# Patient Record
Sex: FEMALE | Race: BLACK OR AFRICAN AMERICAN | NOT HISPANIC OR LATINO | URBAN - METROPOLITAN AREA
[De-identification: names, ages, dates, MRNs, and addresses within clinical notes are randomized per-mention and may not be internally consistent; named-entity substitution may affect disease eponyms.]

---

## 2024-11-01 ENCOUNTER — INPATIENT (INPATIENT)
Facility: HOSPITAL | Age: 69
LOS: 4 days | Discharge: HOME HEALTH | End: 2024-11-06
Attending: INTERNAL MEDICINE | Admitting: INTERNAL MEDICINE
Payer: MEDICARE

## 2024-11-01 VITALS
SYSTOLIC BLOOD PRESSURE: 134 MMHG | TEMPERATURE: 99 F | WEIGHT: 121.25 LBS | OXYGEN SATURATION: 97 % | RESPIRATION RATE: 18 BRPM | HEART RATE: 56 BPM | DIASTOLIC BLOOD PRESSURE: 75 MMHG

## 2024-11-01 DIAGNOSIS — E03.9 HYPOTHYROIDISM, UNSPECIFIED: ICD-10-CM

## 2024-11-01 DIAGNOSIS — H40.9 UNSPECIFIED GLAUCOMA: ICD-10-CM

## 2024-11-01 DIAGNOSIS — Z98.890 OTHER SPECIFIED POSTPROCEDURAL STATES: Chronic | ICD-10-CM

## 2024-11-01 DIAGNOSIS — I10 ESSENTIAL (PRIMARY) HYPERTENSION: ICD-10-CM

## 2024-11-01 DIAGNOSIS — R00.1 BRADYCARDIA, UNSPECIFIED: ICD-10-CM

## 2024-11-01 DIAGNOSIS — E78.5 HYPERLIPIDEMIA, UNSPECIFIED: ICD-10-CM

## 2024-11-01 DIAGNOSIS — R63.4 ABNORMAL WEIGHT LOSS: ICD-10-CM

## 2024-11-01 DIAGNOSIS — Z29.9 ENCOUNTER FOR PROPHYLACTIC MEASURES, UNSPECIFIED: ICD-10-CM

## 2024-11-01 DIAGNOSIS — Z90.710 ACQUIRED ABSENCE OF BOTH CERVIX AND UTERUS: Chronic | ICD-10-CM

## 2024-11-01 LAB
ADD ON TEST-SPECIMEN IN LAB: SIGNIFICANT CHANGE UP
ALBUMIN SERPL ELPH-MCNC: 4.2 G/DL — SIGNIFICANT CHANGE UP (ref 3.3–5)
ALP SERPL-CCNC: 57 U/L — SIGNIFICANT CHANGE UP (ref 40–120)
ALT FLD-CCNC: 26 U/L — SIGNIFICANT CHANGE UP (ref 4–33)
ANION GAP SERPL CALC-SCNC: 12 MMOL/L — SIGNIFICANT CHANGE UP (ref 7–14)
APPEARANCE UR: CLEAR — SIGNIFICANT CHANGE UP
AST SERPL-CCNC: 25 U/L — SIGNIFICANT CHANGE UP (ref 4–32)
B PERT DNA SPEC QL NAA+PROBE: SIGNIFICANT CHANGE UP
B PERT+PARAPERT DNA PNL SPEC NAA+PROBE: SIGNIFICANT CHANGE UP
BASOPHILS # BLD AUTO: 0.03 K/UL — SIGNIFICANT CHANGE UP (ref 0–0.2)
BASOPHILS NFR BLD AUTO: 0.8 % — SIGNIFICANT CHANGE UP (ref 0–2)
BILIRUB SERPL-MCNC: 1 MG/DL — SIGNIFICANT CHANGE UP (ref 0.2–1.2)
BILIRUB UR-MCNC: NEGATIVE — SIGNIFICANT CHANGE UP
BUN SERPL-MCNC: 9 MG/DL — SIGNIFICANT CHANGE UP (ref 7–23)
C PNEUM DNA SPEC QL NAA+PROBE: SIGNIFICANT CHANGE UP
CALCIUM SERPL-MCNC: 9.3 MG/DL — SIGNIFICANT CHANGE UP (ref 8.4–10.5)
CHLORIDE SERPL-SCNC: 104 MMOL/L — SIGNIFICANT CHANGE UP (ref 98–107)
CO2 SERPL-SCNC: 26 MMOL/L — SIGNIFICANT CHANGE UP (ref 22–31)
COLOR SPEC: YELLOW — SIGNIFICANT CHANGE UP
CREAT SERPL-MCNC: 0.73 MG/DL — SIGNIFICANT CHANGE UP (ref 0.5–1.3)
CRP SERPL-MCNC: <3 MG/L — SIGNIFICANT CHANGE UP
DIFF PNL FLD: NEGATIVE — SIGNIFICANT CHANGE UP
EGFR: 88 ML/MIN/1.73M2 — SIGNIFICANT CHANGE UP
EOSINOPHIL # BLD AUTO: 0.16 K/UL — SIGNIFICANT CHANGE UP (ref 0–0.5)
EOSINOPHIL NFR BLD AUTO: 4.5 % — SIGNIFICANT CHANGE UP (ref 0–6)
ERYTHROCYTE [SEDIMENTATION RATE] IN BLOOD: 3 MM/HR — LOW (ref 4–25)
FLUAV AG NPH QL: SIGNIFICANT CHANGE UP
FLUAV SUBTYP SPEC NAA+PROBE: SIGNIFICANT CHANGE UP
FLUBV AG NPH QL: SIGNIFICANT CHANGE UP
FLUBV RNA SPEC QL NAA+PROBE: SIGNIFICANT CHANGE UP
GLUCOSE SERPL-MCNC: 99 MG/DL — SIGNIFICANT CHANGE UP (ref 70–99)
GLUCOSE UR QL: NEGATIVE MG/DL — SIGNIFICANT CHANGE UP
HADV DNA SPEC QL NAA+PROBE: SIGNIFICANT CHANGE UP
HCOV 229E RNA SPEC QL NAA+PROBE: SIGNIFICANT CHANGE UP
HCOV HKU1 RNA SPEC QL NAA+PROBE: SIGNIFICANT CHANGE UP
HCOV NL63 RNA SPEC QL NAA+PROBE: SIGNIFICANT CHANGE UP
HCOV OC43 RNA SPEC QL NAA+PROBE: SIGNIFICANT CHANGE UP
HCT VFR BLD CALC: 38.3 % — SIGNIFICANT CHANGE UP (ref 34.5–45)
HGB BLD-MCNC: 12.7 G/DL — SIGNIFICANT CHANGE UP (ref 11.5–15.5)
HMPV RNA SPEC QL NAA+PROBE: SIGNIFICANT CHANGE UP
HPIV1 RNA SPEC QL NAA+PROBE: SIGNIFICANT CHANGE UP
HPIV2 RNA SPEC QL NAA+PROBE: SIGNIFICANT CHANGE UP
HPIV3 RNA SPEC QL NAA+PROBE: SIGNIFICANT CHANGE UP
HPIV4 RNA SPEC QL NAA+PROBE: SIGNIFICANT CHANGE UP
IANC: 1.82 K/UL — SIGNIFICANT CHANGE UP (ref 1.8–7.4)
IMM GRANULOCYTES NFR BLD AUTO: 0.3 % — SIGNIFICANT CHANGE UP (ref 0–0.9)
KETONES UR-MCNC: NEGATIVE MG/DL — SIGNIFICANT CHANGE UP
LEUKOCYTE ESTERASE UR-ACNC: NEGATIVE — SIGNIFICANT CHANGE UP
LYMPHOCYTES # BLD AUTO: 1.22 K/UL — SIGNIFICANT CHANGE UP (ref 1–3.3)
LYMPHOCYTES # BLD AUTO: 34 % — SIGNIFICANT CHANGE UP (ref 13–44)
M PNEUMO DNA SPEC QL NAA+PROBE: SIGNIFICANT CHANGE UP
MCHC RBC-ENTMCNC: 30.8 PG — SIGNIFICANT CHANGE UP (ref 27–34)
MCHC RBC-ENTMCNC: 33.2 G/DL — SIGNIFICANT CHANGE UP (ref 32–36)
MCV RBC AUTO: 93 FL — SIGNIFICANT CHANGE UP (ref 80–100)
MONOCYTES # BLD AUTO: 0.35 K/UL — SIGNIFICANT CHANGE UP (ref 0–0.9)
MONOCYTES NFR BLD AUTO: 9.7 % — SIGNIFICANT CHANGE UP (ref 2–14)
NEUTROPHILS # BLD AUTO: 1.82 K/UL — SIGNIFICANT CHANGE UP (ref 1.8–7.4)
NEUTROPHILS NFR BLD AUTO: 50.7 % — SIGNIFICANT CHANGE UP (ref 43–77)
NITRITE UR-MCNC: NEGATIVE — SIGNIFICANT CHANGE UP
NRBC # BLD: 0 /100 WBCS — SIGNIFICANT CHANGE UP (ref 0–0)
NRBC # FLD: 0 K/UL — SIGNIFICANT CHANGE UP (ref 0–0)
PH UR: 7.5 — SIGNIFICANT CHANGE UP (ref 5–8)
PLATELET # BLD AUTO: 209 K/UL — SIGNIFICANT CHANGE UP (ref 150–400)
POTASSIUM SERPL-MCNC: 3.6 MMOL/L — SIGNIFICANT CHANGE UP (ref 3.5–5.3)
POTASSIUM SERPL-SCNC: 3.6 MMOL/L — SIGNIFICANT CHANGE UP (ref 3.5–5.3)
PROCALCITONIN SERPL-MCNC: 0.05 NG/ML — SIGNIFICANT CHANGE UP (ref 0.02–0.1)
PROT SERPL-MCNC: 6.7 G/DL — SIGNIFICANT CHANGE UP (ref 6–8.3)
PROT UR-MCNC: NEGATIVE MG/DL — SIGNIFICANT CHANGE UP
RAPID RVP RESULT: SIGNIFICANT CHANGE UP
RBC # BLD: 4.12 M/UL — SIGNIFICANT CHANGE UP (ref 3.8–5.2)
RBC # FLD: 13.5 % — SIGNIFICANT CHANGE UP (ref 10.3–14.5)
RSV RNA NPH QL NAA+NON-PROBE: SIGNIFICANT CHANGE UP
RSV RNA SPEC QL NAA+PROBE: SIGNIFICANT CHANGE UP
RV+EV RNA SPEC QL NAA+PROBE: SIGNIFICANT CHANGE UP
SARS-COV-2 RNA SPEC QL NAA+PROBE: SIGNIFICANT CHANGE UP
SARS-COV-2 RNA SPEC QL NAA+PROBE: SIGNIFICANT CHANGE UP
SODIUM SERPL-SCNC: 142 MMOL/L — SIGNIFICANT CHANGE UP (ref 135–145)
SP GR SPEC: 1.01 — SIGNIFICANT CHANGE UP (ref 1–1.03)
T4 FREE SERPL-MCNC: 2 NG/DL — HIGH (ref 0.9–1.8)
TROPONIN T, HIGH SENSITIVITY RESULT: 14 NG/L — SIGNIFICANT CHANGE UP
TSH SERPL-MCNC: <0.1 UIU/ML — LOW (ref 0.27–4.2)
UROBILINOGEN FLD QL: 1 MG/DL — SIGNIFICANT CHANGE UP (ref 0.2–1)
WBC # BLD: 3.59 K/UL — LOW (ref 3.8–10.5)
WBC # FLD AUTO: 3.59 K/UL — LOW (ref 3.8–10.5)

## 2024-11-01 PROCEDURE — 71045 X-RAY EXAM CHEST 1 VIEW: CPT | Mod: 26

## 2024-11-01 PROCEDURE — 99231 SBSQ HOSP IP/OBS SF/LOW 25: CPT

## 2024-11-01 PROCEDURE — 99223 1ST HOSP IP/OBS HIGH 75: CPT

## 2024-11-01 PROCEDURE — 99285 EMERGENCY DEPT VISIT HI MDM: CPT

## 2024-11-01 RX ORDER — ASPIRIN/MAG CARB/ALUMINUM AMIN 325 MG
81 TABLET ORAL DAILY
Refills: 0 | Status: DISCONTINUED | OUTPATIENT
Start: 2024-11-01 | End: 2024-11-06

## 2024-11-01 RX ORDER — MAGNESIUM SULFATE IN 0.9% NACL 2 G/50 ML
2 INTRAVENOUS SOLUTION, PIGGYBACK (ML) INTRAVENOUS ONCE
Refills: 0 | Status: COMPLETED | OUTPATIENT
Start: 2024-11-01 | End: 2024-11-01

## 2024-11-01 RX ORDER — DORZOLAMIDE HYDROCHLORIDE 20 MG/ML
1 SOLUTION OPHTHALMIC
Refills: 0 | Status: DISCONTINUED | OUTPATIENT
Start: 2024-11-01 | End: 2024-11-06

## 2024-11-01 RX ORDER — ACETAZOLAMIDE EXTENDED-RELEASE 500 MG/1
1 CAPSULE ORAL
Refills: 0 | DISCHARGE

## 2024-11-01 RX ORDER — ENOXAPARIN SODIUM 40MG/0.4ML
40 SYRINGE (ML) SUBCUTANEOUS
Refills: 0 | Status: DISCONTINUED | OUTPATIENT
Start: 2024-11-01 | End: 2024-11-06

## 2024-11-01 RX ORDER — ACETAMINOPHEN 500 MG
650 TABLET ORAL EVERY 6 HOURS
Refills: 0 | Status: DISCONTINUED | OUTPATIENT
Start: 2024-11-01 | End: 2024-11-06

## 2024-11-01 RX ORDER — POTASSIUM CHLORIDE 10 MEQ
40 TABLET, EXTENDED RELEASE ORAL ONCE
Refills: 0 | Status: COMPLETED | OUTPATIENT
Start: 2024-11-01 | End: 2024-11-01

## 2024-11-01 RX ORDER — BRIMONIDINE TARTRATE 0.2 %
1 DROPS OPHTHALMIC (EYE)
Refills: 0 | DISCHARGE

## 2024-11-01 RX ORDER — MELATONIN 5 MG
3 TABLET ORAL AT BEDTIME
Refills: 0 | Status: DISCONTINUED | OUTPATIENT
Start: 2024-11-01 | End: 2024-11-06

## 2024-11-01 RX ORDER — HYDROCHLOROTHIAZIDE 50 MG
1 TABLET ORAL
Refills: 0 | DISCHARGE

## 2024-11-01 RX ORDER — BRIMONIDINE TARTRATE 0.2 %
1 DROPS OPHTHALMIC (EYE)
Refills: 0 | Status: DISCONTINUED | OUTPATIENT
Start: 2024-11-01 | End: 2024-11-06

## 2024-11-01 RX ORDER — LEVOTHYROXINE SODIUM 88 MCG
75 TABLET ORAL DAILY
Refills: 0 | Status: DISCONTINUED | OUTPATIENT
Start: 2024-11-02 | End: 2024-11-06

## 2024-11-01 RX ORDER — TIMOLOL MALEATE 0.5 %
1 DROPS OPHTHALMIC (EYE)
Refills: 0 | Status: DISCONTINUED | OUTPATIENT
Start: 2024-11-01 | End: 2024-11-06

## 2024-11-01 RX ORDER — ACETAZOLAMIDE EXTENDED-RELEASE 500 MG/1
125 CAPSULE ORAL THREE TIMES A DAY
Refills: 0 | Status: DISCONTINUED | OUTPATIENT
Start: 2024-11-01 | End: 2024-11-06

## 2024-11-01 RX ORDER — INFLUENZ VIR VAC TV P-SURF2003 15MCG/.5ML
0.5 SYRINGE (ML) INTRAMUSCULAR ONCE
Refills: 0 | Status: DISCONTINUED | OUTPATIENT
Start: 2024-11-01 | End: 2024-11-06

## 2024-11-01 RX ORDER — LATANOPROST 0.005 %
1 DROPS OPHTHALMIC (EYE) AT BEDTIME
Refills: 0 | Status: DISCONTINUED | OUTPATIENT
Start: 2024-11-01 | End: 2024-11-06

## 2024-11-01 RX ADMIN — ACETAZOLAMIDE EXTENDED-RELEASE 125 MILLIGRAM(S): 500 CAPSULE ORAL at 21:12

## 2024-11-01 RX ADMIN — Medication 81 MILLIGRAM(S): at 21:25

## 2024-11-01 RX ADMIN — Medication 40 MILLIEQUIVALENT(S): at 15:55

## 2024-11-01 RX ADMIN — Medication 1 DROP(S): at 21:12

## 2024-11-01 RX ADMIN — Medication 25 GRAM(S): at 15:55

## 2024-11-01 RX ADMIN — Medication 1 DROP(S): at 21:11

## 2024-11-01 RX ADMIN — DORZOLAMIDE HYDROCHLORIDE 1 DROP(S): 20 SOLUTION OPHTHALMIC at 21:12

## 2024-11-01 RX ADMIN — Medication 10 MILLIGRAM(S): at 21:11

## 2024-11-01 NOTE — ED ADULT TRIAGE NOTE - CHIEF COMPLAINT QUOTE
Recent travel from [facility], states "not feeling well for long time", per daughter pt has been having palpitations and unintentional weight loss and wants further medical evaluation, h/o HTN

## 2024-11-01 NOTE — CHART NOTE - NSCHARTNOTEFT_GEN_A_CORE
69F hx of hypothyroidism presents to ED for asymptomatic bradycardia on routine exam. Endocrine consulted for thyroid abnormalities: low TSH <0.10 and elevated FT4 at 2.    Labs consistent with hyperthyroidism - likely due to higher than needed levothyroxine dose. Hyperthyroidism would NOT be the cause or contribute to bradycardia (if anything you can see tachycardia - although FT4 of 2.0 is very mild). Hyperthyroidism can contribute to weight loss however, given this is mild hyperthyroidis, would not expect it to contribute to this extent as listed in patients history - please evaluate for other etiology.    Recommendations  - decrease dose of levothyroxine to 75mcg daily  - ensure it is taken daily on an empty stomach without any other medications/vitamins  - patient to recheck TSH and Free T4 in 4-6 weeks with outpatient provider    Patient does not need full endocrinology consult at this time. Endocrinology will sign off at this time. Please re-consult us if there any questions or concerns.      Discussed recommendations with primary team.    Magnus Franco MD  Endocrine Fellow  Can be reached via [employer] teams.    For follow up questions, discharge recommendations, or new consults, please email [email or web address] ([facility]) or [email or web address] ([facility]) or call answering service at [phone] (weekdays); [phone] (nights/weekends).  For emergencies please page fellow on call. 69F hx of hypothyroidism presents to ED for asymptomatic bradycardia on routine exam. Endocrine consulted for thyroid abnormalities: low TSH <0.10 and elevated FT4 at 2.    Labs consistent with hyperthyroidism - likely due to higher than needed levothyroxine dose. Hyperthyroidism would NOT be the cause or contribute to bradycardia (if anything you can see tachycardia - although FT4 of 2.0 is very mild). Hyperthyroidism can contribute to weight loss however, given this is mild hyperthyroidism, would not expect it to contribute to this extent as listed in patients history - please evaluate for other etiology.    Recommendations  - decrease dose of levothyroxine to 75mcg daily  - ensure it is taken daily on an empty stomach without any other medications/vitamins  - patient to recheck TSH and Free T4 in 4-6 weeks with outpatient provider    Patient does not need full endocrinology consult at this time. Endocrinology will sign off at this time. Please re-consult us if there any questions or concerns.      Discussed recommendations with primary team.    Magnus Franco MD  Endocrine Fellow  Can be reached via [employer] teams.    For follow up questions, discharge recommendations, or new consults, please email [email or web address] ([facility]) or [email or web address] ([facility]) or call answering service at [phone] (weekdays); [phone] (nights/weekends).  For emergencies please page fellow on call.

## 2024-11-01 NOTE — CONSULT NOTE ADULT - ATTENDING COMMENTS
Quality 431: Preventive Care And Screening: Unhealthy Alcohol Use - Screening: Patient not identified as an unhealthy alcohol user when screened for unhealthy alcohol use using a systematic screening method Quality 137: Melanoma: Continuity Of Care - Recall System: Patient information entered into a recall system that includes: target date for the next exam specified AND a process to follow up with patients regarding missed or unscheduled appointments Quality 226: Preventive Care And Screening: Tobacco Use: Screening And Cessation Intervention: Patient screened for tobacco use and is an ex/non-smoker Detail Level: Detailed Quality 397: Melanoma: Reporting: Pathology report includes the pT Category, thickness, ulceration and mitotic rate, peripheral and deep margin status and presence or absence of microsatellitosis for invasive tumors. 69 year old woman with HTN, hypothyroidism and glaucoma brought to hospital by her daughter for evaluation of bradycardia. In my interview with her, the patient was adamant that she feels no chest pain, palpitations, SOB, dizziness, fatigue, GUNN, near syncope. Her ECG shows sinus bradycardia. No events have been detected on telemetry. Check echo, monitor HR with ambulation. Can consider exercise stress test for chronotropic competence. Quality 130: Documentation Of Current Medications In The Medical Record: Current Medications Documented Quality 138: Melanoma: Coordination Of Care: A treatment plan was communicated to the physicians providing continuing care within one month of diagnosis outlining: diagnosis, tumor thickness and a plan for surgery or alternate care.

## 2024-11-01 NOTE — H&P ADULT - NSHPPHYSICALEXAM_GEN_ALL_CORE
Vitals:  Vital Signs Last 24 Hrs  T(C): 36.4 (01 Nov 2024 16:41), Max: 37.1 (01 Nov 2024 10:33)  T(F): 97.6 (01 Nov 2024 16:41), Max: 98.7 (01 Nov 2024 10:33)  HR: 51 (01 Nov 2024 16:41) (45 - 56)  BP: 175/80 (01 Nov 2024 16:41) (134/75 - 175/80)  BP(mean): 107 (01 Nov 2024 14:24) (107 - 107)  RR: 19 (01 Nov 2024 16:41) (18 - 19)  SpO2: 100% (01 Nov 2024 16:41) (97% - 100%)    Parameters below as of 01 Nov 2024 16:41  Patient On (Oxygen Delivery Method): room air      Oxygenation Index= Unable to calculate   [Based on FiO2 = Unknown, PaO2 = Unknown, MAP = Unknown]  Oxygen Saturation Index= Unable to calculate   [Based on FiO2 = Unknown, SpO2 = 100(11/01/2024 16:41), MAP = Unknown]  Orthostatic VS      I&O's Summary    CAPILLARY BLOOD GLUCOSE          PHYSICAL EXAM:  GENERAL: NAD, lying in bed comfortably, talking in full sentences, no accessory mm use, on RA, appears younger than stated age  HEENT: NC/AT, EOMI, PERRLA, MMM, no LAD  NECK: Supple, No JVD  CHEST/LUNG: CTAB, no increased WOB, no w/r/r  HEART: bradycardic, no m/r/g  ABDOMEN: soft, NT, ND, BS+  EXTREMITIES:  2+ peripheral pulses, no clubbing, no edema  NERVOUS SYSTEM:  A&Ox4, no focal deficits  MSK: FROM all 4 extremities, full and equal strength  SKIN: No overt rashes or lesions

## 2024-11-01 NOTE — H&P ADULT - PROBLEM SELECTOR PLAN 5
- c/w home brimonidine, dorzolamide-timolol, and latanoprost eye drops  - c/w home acetazolamide 125mg TID

## 2024-11-01 NOTE — ED ADULT NURSE REASSESSMENT NOTE - NS ED NURSE REASSESS COMMENT FT1
Facilitator RN: PT is resting in stretcher, easily arousable to verbal stimuli. no acute distress noted. pt medicated as per provider orders. primary nurse aware. plan of care ongoing.

## 2024-11-01 NOTE — ED PROVIDER NOTE - PROGRESS NOTE DETAILS
Francesco Hogue MD, PGY3  Lab work nonactionable.  Noted to have a TSH of less than 0.1.  Patient takes Synthroid daily.  Low TSH not consistent with patient's presentation.  Patient remains bradycardic, sinus bradycardia in 40s while in emergency department.  Normal mentation, normotensive.  Will admit for symptomatic bradycardia.  Discussed with telemetry doc, Dr. Sandra, who accepted admission

## 2024-11-01 NOTE — PATIENT PROFILE ADULT - FLU SEASON?

## 2024-11-01 NOTE — ED PROVIDER NOTE - ATTENDING CONTRIBUTION TO CARE
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  Daughter at bedside states that pt lives in [facility] but she was called by pt's daughter and told that mother was decompensating and she should [place] the [place] for [place].  Pt herself has no acute complaints, including no CP/SOB, N/V/D or abdominal pain.  On exam pt overall well appearing, in NAD, heart bradycardic and regular, lungs CTAB, abd NTND, extremities without swelling, strength 5/5 in all extremities and skin without rash.  EKG showing sinus bradycardia.    I, Santi Alcazar MD, personally made/approved the management plan for this patient and take responsibility for the patient's management. Dr. Alcazar: 70 yo female with PMH HTN, HLD, glaucoma, hypothyroid, in ED with fatigue "for a couple of weeks" and weight loss over longer period of time.  Daughter at bedside states that pt lives in [facility] but she was called by pt's daughter and told that mother was decompensating and she should [place] to the [place] for care.  Pt herself has no acute complaints, including no CP/SOB, N/V/D or abdominal pain.  On exam pt overall well appearing, in NAD, heart bradycardic and regular, lungs CTAB, abd NTND, extremities without swelling, strength 5/5 in all extremities and skin without rash.  EKG showing sinus bradycardia    I, Santi Alcazar MD, personally made/approved the management plan for this patient and take responsibility for the patient's management.

## 2024-11-01 NOTE — H&P ADULT - HISTORY OF PRESENT ILLNESS
Ms. Fuentes is a 68 y/o F with PMH of HTN, HLD, Hypothyroidism, prediabetes, Glaucoma (sp laser iritoectomy in past) who presented from her home in [facility] [place] after notable to have bradycardia to 40s on her routine OP physical exam on 10/25/24. Pt went to her PCP in [facility] for routine annual physical where she was noted to have bradycardia to 48 on EKG. Pt and family arranged for the soonest flight they could to [place] to [place] further medical care. Pt seen in [facility] with daughter Cady and reports no active cp, sob, fevers, chills, n/v/d/c, abd pain, palpitations, dizziness/lightheadedness. Also reports feeling overall without cardiac sx as well during her previous PCP visit too. Has been seeing a neurologist recently for unclear issue and was prescribed 2 medications, unsure of name (daughter to get the bottles). Has been otherwise complaints with meds and no other med changes. No toxic habits x3. Does report having increased weight loss from 160s-170s to 120s recently since April, no appetite issues reports, hematuria, hemoptysis, hematochezia/melena. no active rg or night sweats, reports some weakness/tiredness. Colonoscopy done 4 years in [facility] and self reports to be without issues. No other cardiac hx or hx of arrythmia in past, not symptomatic on ambulation.     In the ED, vitals notable for Bp in 170s/70s, HR in 50s, tele reviewed with HR ranging from 40s-50s. Labs reviewed and rfp wnl, cbc notable for WBC 3.59 rest of labs wnl, Trop wnl x1, TSH low at <0.10, crp wnl. covid/flu/rsv negative. EKG reviewed and notable for sinus bradycardia, HR in 50s. UA negative.     Pt admitted for further workup for asx bradycardia.

## 2024-11-01 NOTE — PATIENT PROFILE ADULT - FALL HARM RISK - UNIVERSAL INTERVENTIONS
Bed in lowest position, wheels locked, appropriate side rails in place/Call bell, personal items and telephone in reach/Instruct patient to call for assistance before getting out of bed or chair/Non-slip footwear when patient is out of bed/[place] to call system/Physically safe environment - no spills, clutter or unnecessary equipment/Purposeful Proactive Rounding/Room/bathroom lighting operational, light cord in reach

## 2024-11-01 NOTE — H&P ADULT - NSICDXPASTMEDICALHX_GEN_ALL_CORE_FT
PAST MEDICAL HISTORY:  Glaucoma     HLD (hyperlipidemia)     HTN (hypertension)     Hypothyroidism

## 2024-11-01 NOTE — ED PROVIDER NOTE - DIFFERENTIAL DIAGNOSIS
electrolyte abnormality, infection, dehydration, intrinsic cardiac issue, ?malignancy (weight loss) Differential Diagnosis

## 2024-11-01 NOTE — H&P ADULT - PROBLEM SELECTOR PLAN 4
- chronic  - on levothyroxine 88mcg at home  - TSH <0.10 but with bradycardia + unintended weight loss  - check FT4/FT3  - endocrine consulted via email given discordance of presenting sx (weight loss, weakness/fatigue with low TSH); recs appreciated

## 2024-11-01 NOTE — ED PROVIDER NOTE - CLINICAL SUMMARY MEDICAL DECISION MAKING FREE TEXT BOX
Francesco Hogue MD, PGY3  69-year-old female with past medical history of hypertension, hypothyroidism, glaucoma presenting to emergency department accompanied by daughter for generalized fatigue, weakness.  Patient resides in [facility], saw primary care physician there was told she had a low heart rate.  Daughter decided to fly patient to [place] to seek further medical care.  Patient arrived in [place] yesterday.  No recent changes to her medications.  No history of cardiac arrhythmia that patient is aware of.  Ambulates without walker at baseline.  Was able to self ambulate earlier today but feels generalized fatigue and weakness when ambulating.  No recent falls or head trauma.  Also endorses weight loss over past few months.  Unsure exactly how much weight patient has lost but daughter states appears much thinner than usual.  Denies fever, headache, chest pain, shortness of breath, abdominal pain, nausea, vomiting, diarrhea, extremity edema, rash.    Gen: No acute distress  HEENT: EOMI, no nasal discharge, mucous membranes moist  CV: RRR, +S1/S2, no M/R/G, 2+ radial pulses b/l  Resp: CTAB, no W/R/R, no accessory muscle use, no increased work of breathing  GI: Abdomen soft non-distended, NTTP  MSK: No open wounds, no bruising, no LE edema  Neuro: A&Ox4, following commands, moving all four extremities spontaneously  Psych: appropriate mood    In the emergency department patient noted to have blood pressure of 45 in triage.  Otherwise hemodynamically stable and afebrile.  Patient in no acute distress.  EKG showing sinus bradycardia.  Exam unremarkable.  Differential including but not limited to arrhythmia, electrolyte abnormalities, thyroid pathology, anemia.  Plan to obtain labs, chest x-ray, urine, . Will reassess, disposition pending workup.

## 2024-11-01 NOTE — H&P ADULT - ASSESSMENT
Ms. Fuentes is a 68 y/o F with PMH of HTN, HLD, Hypothyroidism, prediabetes, Glaucoma (sp laser iritoectomy in past) who presented from her home in [facility] [place] after notable to have bradycardia to 40s on her routine OP physical exam on 10/25/24. Pt went to her PCP in [facility] for routine annual physical where she was noted to have bradycardia to 48 on EKG. EP consulted in the ED. Admitted for further workup for asx bradycardia.

## 2024-11-01 NOTE — H&P ADULT - PROBLEM SELECTOR PLAN 1
- new and asx   - EKG notable for sinus bradycardia with HR in 50s with HR in 40s-50s on tele as well. Trop wnl x1, check another trop.   - EP consulted; recs appreciated  - check TTE  - plan for exercise stress test tomorrow to assess for chronotropic response  - meds reviewed and daughter to bring in other new medications started recently.   - TSH <0.10, check FT4/FT3  - monitor on tele  - keep lytes K >4, Mg >2  - melatonin prn for sleep

## 2024-11-01 NOTE — H&P ADULT - PROBLEM SELECTOR PLAN 2
- ongoing weight loss, about 30-40 lbs lost over the past few months, reports some weakness/fatigue  - check CT chest and AP with IV contrast for further evaluation for ?malignancy  - check iron panel, b12, folate, vit D, FLP, Hba1c for further evaluation / screening

## 2024-11-01 NOTE — ED PROVIDER NOTE - NSICDXPASTMEDICALHX_GEN_ALL_CORE_FT
PAST MEDICAL HISTORY:  Glaucoma     HLD (hyperlipidemia)     HTN (hypertension)     Hypothyroid

## 2024-11-01 NOTE — ED ADULT NURSE NOTE - NSFALLUNIVINTERV_ED_ALL_ED
Bed/Stretcher in lowest position, wheels locked, appropriate side rails in place/Call bell, personal items and telephone in reach/Instruct patient to call for assistance before getting out of bed/chair/stretcher/Non-slip footwear applied when patient is off stretcher/[place] to call system/Physically safe environment - no spills, clutter or unnecessary equipment/Purposeful proactive rounding/Room/bathroom lighting operational, light cord in reach

## 2024-11-01 NOTE — ED ADULT NURSE NOTE - OBJECTIVE STATEMENT
pt brought to area#4 pt A&ox4 pt was sent from MD for eval of slow HR pt Hr noted to be 45 placed on cardiac monitor pt denies any complaints currently v/s charted MD eval in progress

## 2024-11-01 NOTE — H&P ADULT - NSHPLABSRESULTS_GEN_ALL_CORE
LABS:                         12.7   3.59  )-----------( 209      ( 2024 11:40 )             38.3         142  |  104  |  9   ----------------------------<  99  3.6   |  26  |  0.73    Ca    9.3      2024 11:40  Phos  3.7       Mg     2.30         TPro  6.7  /  Alb  4.2  /  TBili  1.0  /  DBili  x   /  AST  25  /  ALT  26  /  AlkPhos  57        Urinalysis Basic - ( 2024 11:40 )    Color: Yellow / Appearance: Clear / S.007 / pH: x  Gluc: 99 mg/dL / Ketone: Negative mg/dL  / Bili: Negative / Urobili: 1.0 mg/dL   Blood: x / Protein: Negative mg/dL / Nitrite: Negative   Leuk Esterase: Negative / RBC: x / WBC x   Sq Epi: x / Non Sq Epi: x / Bacteria: x                  Urinalysis with Rflx Culture (collected 24 @ 11:40)        RADIOLOGY, EKG & ADDITIONAL TESTS: Reviewed.

## 2024-11-01 NOTE — ED ADULT NURSE NOTE - CHIEF COMPLAINT QUOTE
Recent travel from [facility], states "not feeling well for long time", per daughter pt has been having palpitations and unintentional weight loss and wants further medical evaluation, h/o HTN

## 2024-11-01 NOTE — H&P ADULT - PROBLEM SELECTOR PLAN 3
- chronic  - elevated BP in 170s in the ED  - c/w home HCTZ 12.5mg daily   - monitor BP closely  - c/w home aspirin (no hx of stents / MI in past)

## 2024-11-02 ENCOUNTER — RESULT REVIEW (OUTPATIENT)
Age: 69
End: 2024-11-02

## 2024-11-02 LAB
24R-OH-CALCIDIOL SERPL-MCNC: 46.2 NG/ML — SIGNIFICANT CHANGE UP (ref 30–80)
A1C WITH ESTIMATED AVERAGE GLUCOSE RESULT: 6 % — HIGH (ref 4–5.6)
ALBUMIN SERPL ELPH-MCNC: 3.7 G/DL — SIGNIFICANT CHANGE UP (ref 3.3–5)
ALP SERPL-CCNC: 54 U/L — SIGNIFICANT CHANGE UP (ref 40–120)
ALT FLD-CCNC: 21 U/L — SIGNIFICANT CHANGE UP (ref 4–33)
ANION GAP SERPL CALC-SCNC: 12 MMOL/L — SIGNIFICANT CHANGE UP (ref 7–14)
AST SERPL-CCNC: 24 U/L — SIGNIFICANT CHANGE UP (ref 4–32)
BASOPHILS # BLD AUTO: 0.04 K/UL — SIGNIFICANT CHANGE UP (ref 0–0.2)
BASOPHILS NFR BLD AUTO: 1.2 % — SIGNIFICANT CHANGE UP (ref 0–2)
BILIRUB SERPL-MCNC: 0.8 MG/DL — SIGNIFICANT CHANGE UP (ref 0.2–1.2)
BUN SERPL-MCNC: 8 MG/DL — SIGNIFICANT CHANGE UP (ref 7–23)
CALCIUM SERPL-MCNC: 8.7 MG/DL — SIGNIFICANT CHANGE UP (ref 8.4–10.5)
CHLORIDE SERPL-SCNC: 107 MMOL/L — SIGNIFICANT CHANGE UP (ref 98–107)
CHOLEST SERPL-MCNC: 126 MG/DL — SIGNIFICANT CHANGE UP
CO2 SERPL-SCNC: 22 MMOL/L — SIGNIFICANT CHANGE UP (ref 22–31)
CREAT SERPL-MCNC: 0.72 MG/DL — SIGNIFICANT CHANGE UP (ref 0.5–1.3)
EGFR: 89 ML/MIN/1.73M2 — SIGNIFICANT CHANGE UP
EOSINOPHIL # BLD AUTO: 0.16 K/UL — SIGNIFICANT CHANGE UP (ref 0–0.5)
EOSINOPHIL NFR BLD AUTO: 4.9 % — SIGNIFICANT CHANGE UP (ref 0–6)
ESTIMATED AVERAGE GLUCOSE: 126 — SIGNIFICANT CHANGE UP
FERRITIN SERPL-MCNC: 202 NG/ML — SIGNIFICANT CHANGE UP (ref 13–330)
FOLATE SERPL-MCNC: 9.2 NG/ML — SIGNIFICANT CHANGE UP (ref 3.1–17.5)
GLUCOSE SERPL-MCNC: 92 MG/DL — SIGNIFICANT CHANGE UP (ref 70–99)
HAPTOGLOB SERPL-MCNC: 53 MG/DL — SIGNIFICANT CHANGE UP (ref 34–200)
HCT VFR BLD CALC: 39 % — SIGNIFICANT CHANGE UP (ref 34.5–45)
HDLC SERPL-MCNC: 44 MG/DL — LOW
HGB BLD-MCNC: 12.8 G/DL — SIGNIFICANT CHANGE UP (ref 11.5–15.5)
IANC: 1.6 K/UL — LOW (ref 1.8–7.4)
IMM GRANULOCYTES NFR BLD AUTO: 0.3 % — SIGNIFICANT CHANGE UP (ref 0–0.9)
IRON SATN MFR SERPL: 22 % — SIGNIFICANT CHANGE UP (ref 14–50)
IRON SATN MFR SERPL: 48 UG/DL — SIGNIFICANT CHANGE UP (ref 30–160)
LDH SERPL L TO P-CCNC: 237 U/L — HIGH (ref 135–225)
LIPID PNL WITH DIRECT LDL SERPL: 71 MG/DL — SIGNIFICANT CHANGE UP
LYMPHOCYTES # BLD AUTO: 1.13 K/UL — SIGNIFICANT CHANGE UP (ref 1–3.3)
LYMPHOCYTES # BLD AUTO: 34.7 % — SIGNIFICANT CHANGE UP (ref 13–44)
MCHC RBC-ENTMCNC: 30.5 PG — SIGNIFICANT CHANGE UP (ref 27–34)
MCHC RBC-ENTMCNC: 32.8 G/DL — SIGNIFICANT CHANGE UP (ref 32–36)
MCV RBC AUTO: 93.1 FL — SIGNIFICANT CHANGE UP (ref 80–100)
MONOCYTES # BLD AUTO: 0.32 K/UL — SIGNIFICANT CHANGE UP (ref 0–0.9)
MONOCYTES NFR BLD AUTO: 9.8 % — SIGNIFICANT CHANGE UP (ref 2–14)
NEUTROPHILS # BLD AUTO: 1.6 K/UL — LOW (ref 1.8–7.4)
NEUTROPHILS NFR BLD AUTO: 49.1 % — SIGNIFICANT CHANGE UP (ref 43–77)
NON HDL CHOLESTEROL: 82 MG/DL — SIGNIFICANT CHANGE UP
NRBC # BLD: 0 /100 WBCS — SIGNIFICANT CHANGE UP (ref 0–0)
NRBC # FLD: 0 K/UL — SIGNIFICANT CHANGE UP (ref 0–0)
PLATELET # BLD AUTO: 197 K/UL — SIGNIFICANT CHANGE UP (ref 150–400)
POTASSIUM SERPL-MCNC: 3 MMOL/L — LOW (ref 3.5–5.3)
POTASSIUM SERPL-SCNC: 3 MMOL/L — LOW (ref 3.5–5.3)
PROT SERPL-MCNC: 6.3 G/DL — SIGNIFICANT CHANGE UP (ref 6–8.3)
RBC # BLD: 4.19 M/UL — SIGNIFICANT CHANGE UP (ref 3.8–5.2)
RBC # FLD: 13.3 % — SIGNIFICANT CHANGE UP (ref 10.3–14.5)
SODIUM SERPL-SCNC: 141 MMOL/L — SIGNIFICANT CHANGE UP (ref 135–145)
T3FREE SERPL-MCNC: 2.5 PG/ML — SIGNIFICANT CHANGE UP (ref 2–4.4)
THYROGLOB AB FLD IA-ACNC: 3652 IU/ML — HIGH
THYROGLOB AB SERPL-ACNC: 3652 IU/ML — HIGH
THYROGLOB SERPL-MCNC: <0.1 NG/ML — LOW (ref 0.9–77.3)
THYROPEROXIDASE AB SERPL-ACNC: 164 IU/ML — HIGH
TIBC SERPL-MCNC: 219 UG/DL — LOW (ref 220–430)
TRIGL SERPL-MCNC: 53 MG/DL — SIGNIFICANT CHANGE UP
TROPONIN T, HIGH SENSITIVITY RESULT: 14 NG/L — SIGNIFICANT CHANGE UP
UIBC SERPL-MCNC: 171 UG/DL — SIGNIFICANT CHANGE UP (ref 110–370)
VIT B12 SERPL-MCNC: 421 PG/ML — SIGNIFICANT CHANGE UP (ref 200–900)
WBC # BLD: 3.26 K/UL — LOW (ref 3.8–10.5)
WBC # FLD AUTO: 3.26 K/UL — LOW (ref 3.8–10.5)

## 2024-11-02 PROCEDURE — 71260 CT THORAX DX C+: CPT | Mod: 26

## 2024-11-02 PROCEDURE — 74177 CT ABD & PELVIS W/CONTRAST: CPT | Mod: 26

## 2024-11-02 PROCEDURE — 93016 CV STRESS TEST SUPVJ ONLY: CPT | Mod: GC

## 2024-11-02 PROCEDURE — 93018 CV STRESS TEST I&R ONLY: CPT | Mod: GC

## 2024-11-02 RX ORDER — POTASSIUM CHLORIDE 10 MEQ
40 TABLET, EXTENDED RELEASE ORAL EVERY 4 HOURS
Refills: 0 | Status: COMPLETED | OUTPATIENT
Start: 2024-11-02 | End: 2024-11-02

## 2024-11-02 RX ADMIN — Medication 40 MILLIEQUIVALENT(S): at 10:24

## 2024-11-02 RX ADMIN — DORZOLAMIDE HYDROCHLORIDE 1 DROP(S): 20 SOLUTION OPHTHALMIC at 17:18

## 2024-11-02 RX ADMIN — Medication 1 DROP(S): at 05:45

## 2024-11-02 RX ADMIN — Medication 40 MILLIGRAM(S): at 21:33

## 2024-11-02 RX ADMIN — Medication 40 MILLIEQUIVALENT(S): at 14:08

## 2024-11-02 RX ADMIN — DORZOLAMIDE HYDROCHLORIDE 1 DROP(S): 20 SOLUTION OPHTHALMIC at 05:44

## 2024-11-02 RX ADMIN — ACETAZOLAMIDE EXTENDED-RELEASE 125 MILLIGRAM(S): 500 CAPSULE ORAL at 07:44

## 2024-11-02 RX ADMIN — Medication 1 DROP(S): at 21:33

## 2024-11-02 RX ADMIN — Medication 10 MILLIGRAM(S): at 21:33

## 2024-11-02 RX ADMIN — ACETAZOLAMIDE EXTENDED-RELEASE 125 MILLIGRAM(S): 500 CAPSULE ORAL at 14:09

## 2024-11-02 RX ADMIN — ACETAZOLAMIDE EXTENDED-RELEASE 125 MILLIGRAM(S): 500 CAPSULE ORAL at 21:34

## 2024-11-02 RX ADMIN — Medication 75 MICROGRAM(S): at 05:42

## 2024-11-02 RX ADMIN — Medication 1 DROP(S): at 05:44

## 2024-11-02 RX ADMIN — Medication 1 DROP(S): at 17:17

## 2024-11-02 RX ADMIN — Medication 81 MILLIGRAM(S): at 14:09

## 2024-11-02 RX ADMIN — Medication 1 DROP(S): at 17:16

## 2024-11-02 NOTE — DIETITIAN INITIAL EVALUATION ADULT - OTHER INFO
Patient is receiving a PO diet order. Denies difficulty chewing or swallowing on current diet consistency. Reports fair PO intake with fair appetite, usually completes 50% of meals per patient reports. Patient is able to make her meal selection at visit. No nausea, vomit, diarrhea, constipation. + BM reported 11/1 per RN flowsheet. Labs notable for low TSH, levothyroxine 75mcg was initiated per endocrinology due to "Labs consistent with hyperthyroidism - likely due to higher than needed levothyroxine dose". Also noted hypokalemia 3 s/p KCl repletion. Patient hx prediabetes, noted Hgb A1c@ 6% (11/1). Dosing weight is 55kg /121 lbs (11/1), Bedscale confirmed 117.4 lbs /53.3 kg. Dosing weight suggests a 48 lbs/28.4% weight loss in ~6 months. Patient noted moderate- severe physical findings, meeting criteria for malnutrition at this time. Patient is amenable for Ensure Plus High Protein 8 oz. 2 x/day (700kcal, 40gm protein) for nutrition support at this time. Patient verbalized understanding of current diet order and no other nutrition related question/concern voiced.

## 2024-11-02 NOTE — DIETITIAN INITIAL EVALUATION ADULT - ADD RECOMMEND
1. Recommend current diet with provision of Ensure Plus High Protein 8 oz. 2 x/day  2. Nursing to consistently document % PO intake in RN flow sheets and monitor weekly weights.   3. Continue to monitor skin integrity, bowel regimen, and nutrition pertinent labs.

## 2024-11-02 NOTE — DIETITIAN INITIAL EVALUATION ADULT - ORAL INTAKE PTA/DIET HISTORY
Patient is A&Ox 3-4 at baseline. Confirmed no food allergy or intolerance, no pork/lamb per preferences. Patient is following low sodium diet at home. Patient endorses recent weight loss in 6 months from 169 lbs in April and slightly decreased appetite for unclear reason. Patient mentioned that she traveled last year for three months (Nov-), then she started to feel weight loss, fatigue, and weakness over time. Patient stated her foods in pantry room were not good however patient is back and forth talking about her home in  and [facility]. Patient did not elaborate more about possible food insecurity. Estimated PO intake is </=75% EER>/=1 months as a result per discussion.    Per [facility] record, obtained weight history as followin.6kg in 2018.

## 2024-11-02 NOTE — DIETITIAN INITIAL EVALUATION ADULT - PROBLEM SELECTOR PLAN 4
mother
- chronic  - on levothyroxine 88mcg at home  - TSH <0.10 but with bradycardia + unintended weight loss  - check FT4/FT3  - endocrine consulted via email given discordance of presenting sx (weight loss, weakness/fatigue with low TSH); recs appreciated

## 2024-11-02 NOTE — CONSULT NOTE ADULT - SUBJECTIVE AND OBJECTIVE BOX
Cardiology Consult Note   [Please check [email or web address] password: [email or web address]" for cardiology service schedule and contact information]    HPI: 70 yo F hx HTN, Hypothyroid, Glaucoma presenting for concern for symptomatic bradycardia.    Pt lives in [facility]. Reports she has felt normal but saw her PCP on Thursday who raised concern that she was bradycardic and appeared SOB. Daughter asked her to fly to [place] for further evaluation. Pt reports she walked to the airport wo SOB, LH, CP or other symptoms though reportedly told ED providers she has felt fatigued and weak. Daughter also reports patient appears to have lost weight though unclear how much.      PAST MEDICAL & SURGICAL HISTORY:  Glaucoma      Hypothyroid      HTN (hypertension)      HLD (hyperlipidemia)      H/O abdominal hysterectomy        FAMILY HISTORY:  No pertinent family history in first degree relatives      SOCIAL HISTORY:  unchanged    MEDICATIONS:        acetaminophen     Tablet .. 650 milliGRAM(s) Oral every 6 hours PRN  melatonin 3 milliGRAM(s) Oral at bedtime PRN        magnesium sulfate  IVPB 2 Gram(s) IV Intermittent once  potassium chloride   Powder 40 milliEquivalent(s) Oral once        -------------------------------------------------------------------------------------------  PHYSICAL EXAM:  T(C): 36.4 (11-01-24 @ 14:24), Max: 37.1 (11-01-24 @ 10:33)  HR: 50 (11-01-24 @ 14:24) (45 - 56)  BP: 173/79 (11-01-24 @ 14:24) (134/75 - 173/79)  RR: 19 (11-01-24 @ 14:24) (18 - 19)  SpO2: 98% (11-01-24 @ 14:24) (97% - 98%)  Wt(kg): --  I&O's Summary      GENERAL: Somewhat fatigued appearing but NAD  HEENT: EOMI  CHEST/LUNG:  Unlabored respirations.  HEART: Bradycardia; No murmurs, rubs, or gallops.  ABDOMEN: soft, NTND  EXTREMITIES:  no edema    -------------------------------------------------------------------------------------------  LABS:                          12.7   3.59  )-----------( 209      ( 01 Nov 2024 11:40 )             38.3     11-01    142  |  104  |  9   ----------------------------<  99  3.6   |  26  |  0.73    Ca    9.3      01 Nov 2024 11:40    TPro  6.7  /  Alb  4.2  /  TBili  1.0  /  DBili  x   /  AST  25  /  ALT  26  /  AlkPhos  57  11-01      CARDIAC MARKERS ( 01 Nov 2024 11:40 )  14 ng/L / x     / x     / x     / x     / x              acetaminophen     Tablet .. 650 milliGRAM(s) Oral every 6 hours PRN  melatonin 3 milliGRAM(s) Oral at bedtime PRN      -------------------------------------------------------------------------------------------  Cardiovascular Diagnostic Testing:    ECG:     Echo:     Stress Testing:    Cath:    -------------------------------------------------------------------------------------------                
HPI:  Ms. Fuentes is a 70 y/o F with PMH of HTN, HLD, Hypothyroidism, prediabetes, Glaucoma (sp laser iritoectomy in past) who presented from her home in [facility] [place] after notable to have bradycardia to 40s on her routine OP physical exam on 10/25/24. Pt went to her PCP in [facility] for routine annual physical where she was noted to have bradycardia to 48 on EKG. Pt and family arranged for the soonest flight they [place] to [place] further medical care. Pt seen in [facility] with daughter Cady and reports no active cp, sob, fevers, chills, n/v/d/c, abd pain, palpitations, dizziness/lightheadedness. Also reports feeling overall without cardiac sx as well during her previous PCP visit too. Has been seeing a neurologist recently for unclear issue and was prescribed 2 medications, unsure of name (daughter to get the bottles). Has been otherwise complaints with meds and no other med changes. No toxic habits x3. Does report having increased weight loss from 160s-170s to 120s recently since April, no appetite issues reports, hematuria, hemoptysis, hematochezia/melena. no active rg or night sweats, reports some weakness/tiredness. Colonoscopy done 4 years in [facility] and self reports to be without issues. No other cardiac hx or hx of arrythmia in past, not symptomatic on ambulation.     In the ED, vitals notable for Bp in 170s/70s, HR in 50s, tele reviewed with HR ranging from 40s-50s. Labs reviewed and rfp wnl, cbc notable for WBC 3.59 rest of labs wnl, Trop wnl x1, TSH low at <0.10, crp wnl. covid/flu/rsv negative. EKG reviewed and notable for sinus bradycardia, HR in 50s. UA negative.     Pt admitted for further workup for asx bradycardia.  (01 Nov 2024 16:34)      PAST MEDICAL & SURGICAL HISTORY:  Glaucoma      HTN (hypertension)      HLD (hyperlipidemia)      Hypothyroidism      H/O abdominal hysterectomy      H/O laser iridotomy          Review of Systems:   CONSTITUTIONAL: No fever, weight loss, or fatigue  EYES: No eye pain, visual disturbances, or discharge  ENMT:  No difficulty hearing, tinnitus, vertigo; No sinus or throat pain  NECK: No pain or stiffness  BREASTS: No pain, masses, or nipple discharge  RESPIRATORY: No cough, wheezing, chills or hemoptysis; No shortness of breath  CARDIOVASCULAR: No chest pain, palpitations, dizziness, or leg swelling  GASTROINTESTINAL: No abdominal or epigastric pain. No nausea, vomiting, or hematemesis; No diarrhea or constipation. No melena or hematochezia.  GENITOURINARY: No dysuria, frequency, hematuria, or incontinence  NEUROLOGICAL: No headaches, memory loss, loss of strength, numbness, or tremors  SKIN: No itching, burning, rashes, or lesions   LYMPH NODES: No enlarged glands  ENDOCRINE: No heat or cold intolerance; No hair loss  MUSCULOSKELETAL: No joint pain or swelling; No muscle, back, or extremity pain  PSYCHIATRIC: No depression, anxiety, mood swings, or difficulty sleeping  HEME/LYMPH: No easy bruising, or bleeding gums  ALLERY AND IMMUNOLOGIC: No hives or eczema    Allergies    No Known Allergies    Intolerances        Social History:     FAMILY HISTORY:  No pertinent family history in first degree relatives        MEDICATIONS  (STANDING):  acetaZOLAMIDE    Tablet 125 milliGRAM(s) Oral three times a day  aspirin enteric coated 81 milliGRAM(s) Oral daily  atorvastatin 10 milliGRAM(s) Oral at bedtime  brimonidine 0.2% Ophthalmic Solution 1 Drop(s) Both EYES two times a day  dorzolamide 2% Ophthalmic Solution 1 Drop(s) Both EYES <User Schedule>  enoxaparin Injectable 40 milliGRAM(s) SubCutaneous <User Schedule>  hydrochlorothiazide 12.5 milliGRAM(s) Oral daily  influenza  Vaccine (HIGH DOSE) 0.5 milliLiter(s) IntraMuscular once  latanoprost 0.005% Ophthalmic Solution 1 Drop(s) Both EYES at bedtime  levothyroxine 75 MICROGram(s) Oral daily  timolol 0.5% Solution 1 Drop(s) Both EYES <User Schedule>    MEDICATIONS  (PRN):  acetaminophen     Tablet .. 650 milliGRAM(s) Oral every 6 hours PRN Temp greater or equal to 38C (100.4F), Mild Pain (1 - 3)  melatonin 3 milliGRAM(s) Oral at bedtime PRN Insomnia        CAPILLARY BLOOD GLUCOSE        I&O's Summary      PHYSICAL EXAM:  GENERAL: NAD, well-developed  HEAD:  Atraumatic, Normocephalic  EYES: EOMI, PERRLA, conjunctiva and sclera clear  NECK: Supple, No JVD  CHEST/LUNG: Clear to auscultation bilaterally; No wheeze  HEART: Regular rate and rhythm; No murmurs, rubs, or gallops  ABDOMEN: Soft, Nontender, Nondistended; Bowel sounds present  EXTREMITIES:  2+ Peripheral Pulses, No clubbing, cyanosis, or edema  PSYCH: AAOx3  NEUROLOGY: non-focal  SKIN: No rashes or lesions    LABS:                        12.8   3.26  )-----------( 197      ( 02 Nov 2024 06:44 )             39.0     11-02    141  |  107  |  8   ----------------------------<  92  3.0[L]   |  22  |  0.72    Ca    8.7      02 Nov 2024 06:44  Phos  3.7     11-01  Mg     2.30     11-01    TPro  6.3  /  Alb  3.7  /  TBili  0.8  /  DBili  x   /  AST  24  /  ALT  21  /  AlkPhos  54  11-02          Urinalysis Basic - ( 02 Nov 2024 06:44 )    Color: x / Appearance: x / SG: x / pH: x  Gluc: 92 mg/dL / Ketone: x  / Bili: x / Urobili: x   Blood: x / Protein: x / Nitrite: x   Leuk Esterase: x / RBC: x / WBC x   Sq Epi: x / Non Sq Epi: x / Bacteria: x        RADIOLOGY & ADDITIONAL TESTS:    Imaging Personally Reviewed:    Consultant(s) Notes Reviewed:      Care Discussed with Consultants/Other Providers:  
  CARDIOLOGY CONSULT - Dr. Sandra  Date of Service: 11/2/2024    HPI:  Ms. Fuentes is a 70 y/o F with PMH of HTN, HLD, Hypothyroidism, prediabetes, Glaucoma (sp laser iritoectomy in past) who presented from her home in [facility] [place] after notable to have bradycardia to 40s on her routine OP physical exam on 10/25/24. Pt went to her PCP in [facility] for routine annual physical where she was noted to have bradycardia to 48 on EKG. Pt and family arranged for the soonest flight they [place] to [place] to [place] further medical care. Pt seen in [facility] with daughter Cady and reports no active cp, sob, fevers, chills, n/v/d/c, abd pain, palpitations, dizziness/lightheadedness. Also reports feeling overall without cardiac sx as well during her previous PCP visit too. Has been seeing a neurologist recently for unclear issue and was prescribed 2 medications, unsure of name (daughter to get the bottles). Has been otherwise complaints with meds and no other med changes. No toxic habits x3. Does report having increased weight loss from 160s-170s to 120s recently since April, no appetite issues reports, hematuria, hemoptysis, hematochezia/melena. no active rg or night sweats, reports some weakness/tiredness. Colonoscopy done 4 years in [facility] and self reports to be without issues. No other cardiac hx or hx of arrythmia in past, not symptomatic on ambulation.     In the ED, vitals notable for Bp in 170s/70s, HR in 50s, tele reviewed with HR ranging from 40s-50s. Labs reviewed and rfp wnl, cbc notable for WBC 3.59 rest of labs wnl, Trop wnl x1, TSH low at <0.10, crp wnl. covid/flu/rsv negative. EKG reviewed and notable for sinus bradycardia, HR in 50s. UA negative.     Pt admitted for further workup for asx bradycardia.  (01 Nov 2024 16:34)      PAST MEDICAL & SURGICAL HISTORY:  Glaucoma      HTN (hypertension)      HLD (hyperlipidemia)      Hypothyroidism      H/O abdominal hysterectomy      H/O laser iridotomy              PREVIOUS DIAGNOSTIC TESTING:    [ ] Echocardiogram:  [ ]  Catheterization:  [ ] Stress Test:  	    MEDICATIONS:  MEDICATIONS  (STANDING):  acetaZOLAMIDE    Tablet 125 milliGRAM(s) Oral three times a day  aspirin enteric coated 81 milliGRAM(s) Oral daily  atorvastatin 10 milliGRAM(s) Oral at bedtime  brimonidine 0.2% Ophthalmic Solution 1 Drop(s) Both EYES two times a day  dorzolamide 2% Ophthalmic Solution 1 Drop(s) Both EYES <User Schedule>  enoxaparin Injectable 40 milliGRAM(s) SubCutaneous <User Schedule>  hydrochlorothiazide 12.5 milliGRAM(s) Oral daily  influenza  Vaccine (HIGH DOSE) 0.5 milliLiter(s) IntraMuscular once  latanoprost 0.005% Ophthalmic Solution 1 Drop(s) Both EYES at bedtime  levothyroxine 75 MICROGram(s) Oral daily  timolol 0.5% Solution 1 Drop(s) Both EYES <User Schedule>      FAMILY HISTORY:  No pertinent family history in first degree relatives        SOCIAL HISTORY:    [ ] Non-smoker  [ ] Smoker  [ ] Alcohol    Allergies    No Known Allergies    Intolerances    	    REVIEW OF SYSTEMS:  CONSTITUTIONAL: No fever, weight loss, or fatigue  EYES: No eye pain, visual disturbances, or discharge  ENMT:  No difficulty hearing, tinnitus, vertigo; No sinus or throat pain  NECK: No pain or stiffness  RESPIRATORY: No cough, wheezing, chills or hemoptysis; No Shortness of Breath  CARDIOVASCULAR: No chest pain, palpitations, passing out, dizziness, or leg swelling  GASTROINTESTINAL: No abdominal or epigastric pain. No nausea, vomiting, or hematemesis; No diarrhea or constipation. No melena or hematochezia.  GENITOURINARY: No dysuria, frequency, hematuria, or incontinence  NEUROLOGICAL: No headaches, memory loss, loss of strength, numbness, or tremors  SKIN: No itching, burning, rashes, or lesions   	    [ ] All others negative	  [ ] Unable to obtain    PHYSICAL EXAM:  T(C): 36.4 (11-02-24 @ 06:20), Max: 37.1 (11-01-24 @ 10:33)  HR: 75 (11-02-24 @ 06:20) (45 - 80)  BP: 146/84 (11-02-24 @ 06:20) (109/62 - 177/86)  RR: 19 (11-02-24 @ 06:20) (18 - 20)  SpO2: 100% (11-02-24 @ 06:20) (96% - 100%)  Wt(kg): --  I&O's Summary      Appearance: Normal	  Psychiatry: A & O x 3, Mood & affect appropriate  HEENT:   Normal oral mucosa, PERRL, EOMI	  Lymphatic: No lymphadenopathy  Cardiovascular: Normal S1 S2,RRR, No JVD, No murmurs  Respiratory: Lungs clear to auscultation	  Gastrointestinal:  Soft, Non-tender, + BS	  Skin: No rashes, No ecchymoses, No cyanosis	  Neurologic: Non-focal  Extremities: Normal range of motion, No clubbing, cyanosis or edema  Vascular: Peripheral pulses palpable 2+ bilaterally    TELEMETRY: 	    ECG:  	  RADIOLOGY:  OTHER: 	  	  LABS:	 	    CARDIAC MARKERS:                                  12.7   3.59  )-----------( 209      ( 01 Nov 2024 11:40 )             38.3     11-01    142  |  104  |  9   ----------------------------<  99  3.6   |  26  |  0.73    Ca    9.3      01 Nov 2024 11:40  Phos  3.7     11-01  Mg     2.30     11-01    TPro  6.7  /  Alb  4.2  /  TBili  1.0  /  DBili  x   /  AST  25  /  ALT  26  /  AlkPhos  57  11-01      proBNP:   Lipid Profile:   HgA1c:   TSH: Thyroid Stimulating Hormone, Serum: <0.10 uIU/mL (11-01 @ 11:40)      ASSESSMENT/PLAN: 	              70 minutes spent on total encounter; more than 50% of the visit was spent counseling and/or coordinating care by the attending physician.

## 2024-11-02 NOTE — DIETITIAN INITIAL EVALUATION ADULT - PERTINENT LABORATORY DATA
11-02    141  |  107  |  8   ----------------------------<  92  3.0[L]   |  22  |  0.72    Ca    8.7      02 Nov 2024 06:44  Phos  3.7     11-01  Mg     2.30     11-01    TPro  6.3  /  Alb  3.7  /  TBili  0.8  /  DBili  x   /  AST  24  /  ALT  21  /  AlkPhos  54  11-02  A1C with Estimated Average Glucose Result: 6.0 % (11-02-24 @ 06:44)

## 2024-11-02 NOTE — DIETITIAN INITIAL EVALUATION ADULT - PERTINENT MEDS FT
MEDICATIONS  (STANDING):  acetaZOLAMIDE    Tablet 125 milliGRAM(s) Oral three times a day  aspirin enteric coated 81 milliGRAM(s) Oral daily  atorvastatin 10 milliGRAM(s) Oral at bedtime  brimonidine 0.2% Ophthalmic Solution 1 Drop(s) Both EYES two times a day  dorzolamide 2% Ophthalmic Solution 1 Drop(s) Both EYES <User Schedule>  enoxaparin Injectable 40 milliGRAM(s) SubCutaneous <User Schedule>  hydrochlorothiazide 12.5 milliGRAM(s) Oral daily  influenza  Vaccine (HIGH DOSE) 0.5 milliLiter(s) IntraMuscular once  latanoprost 0.005% Ophthalmic Solution 1 Drop(s) Both EYES at bedtime  levothyroxine 75 MICROGram(s) Oral daily  timolol 0.5% Solution 1 Drop(s) Both EYES <User Schedule>    MEDICATIONS  (PRN):  acetaminophen     Tablet .. 650 milliGRAM(s) Oral every 6 hours PRN Temp greater or equal to 38C (100.4F), Mild Pain (1 - 3)  melatonin 3 milliGRAM(s) Oral at bedtime PRN Insomnia

## 2024-11-02 NOTE — DIETITIAN INITIAL EVALUATION ADULT - REASON FOR ADMISSION
Bradycardia  Per chart review, pt is a Ms. Gina is a 68 y/o F with PMH of HTN, HLD, Hypothyroidism, prediabetes, Glaucoma (sp laser iritoectomy in past) who presented from her home in [facility] [place] after notable to have bradycardia to 40s on her routine OP physical exam on 10/25/24. Pt went to her PCP in [facility] for routine annual physical where she was noted to have bradycardia to 48 on EKG. Pt and family arranged for the soonest flight they [place] to [place] further medical care. Pt seen in [facility] with daughter Cady and reports no active cp, sob, fevers, chills, n/v/d/c, abd pain, palpitations, dizziness/lightheadedness. Also reports feeling overall without cardiac sx as well during her previous PCP visit too. Has been seeing a neurologist recently for unclear issue and was prescribed 2 medications, unsure of name (daughter to get the bottles). Has been otherwise complaints with meds and no other med changes. No toxic habits x3. Does report having increased weight loss from 160s-170s to 120s recently since April, no appetite issues reports, hematuria, hemoptysis, hematochezia/melena. no active rg or night sweats, reports some weakness/tiredness. Colonoscopy done 4 years in [facility] and self reports to be without issues. No other cardiac hx or hx of arrythmia in past, not symptomatic on ambulation.

## 2024-11-02 NOTE — CONSULT NOTE ADULT - ASSESSMENT
68 yo F hx HTN, Hypothyroid, Glaucoma presenting for concern for symptomatic bradycardia.    Tele with bradycardia high 40s-low 50s with occasional PACs and PVCs. Denies symptoms currently or prior to arrival including with exertion. Unclear if was truly having SOB at PCP appointment on Thursday. Would obtain TTE as well as stress test to assess chronotropic response. TSH undetectable. Would obtain fT4 to r/o secondary hypothyroidism.    Recommendations:  - ctm Tele  - Zoll pads on  - TTE  - Exercise ST to assess chronotropic response pending TTE  - K > 4.0, Mg > 2.0  - f/u fT4
Ms. Fuentes is a 70 y/o F with PMH of HTN, HLD, Hypothyroidism, prediabetes, Glaucoma (sp laser iritoectomy in past) who presented from her home in [facility] [place] after notable to have bradycardia to 40s on her routine OP physical exam    #Sinus Bradycardia  -stable  -no evidence of pathologic bradycardia  -ep eval appreciated  -plan for exercise stress  -TTE    #HTN  -cont outpt meds    #DM  -med eval    75 minutes spent on total encounter; more than 50% of the visit was spent counseling and/or coordinating care by the attending physician.  
68 y/o F with PMH of HTN, HLD, Hypothyroidism, prediabetes, Glaucoma (sp laser iritoectomy in past) who presented from her home in [facility] [place] after notable to have bradycardia to 40s on her routine OP physical exam on 10/25/24      Bradycardia Tele.fllow up Echo  Cards consulted  Avoid AV nidal blocking agents     Hypothyroidism   Continue with Synthroid

## 2024-11-02 NOTE — DIETITIAN INITIAL EVALUATION ADULT - NS FNS DIET ORDER
Diet, Regular:   DASH/TLC {Sodium & Cholesterol Restricted} (DASH)  No Caffeine (11-01-24 @ 17:25)

## 2024-11-02 NOTE — DIETITIAN INITIAL EVALUATION ADULT - NUTRITON FOCUSED PHYSICAL EXAM
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 Patient says he has been feeling intermittently dizzy over the past 3 days despite having adequate/normal PO intake. Patient fell yesterday at home after feeling dizzy hitting the back of his head, but with no LOC. No AC. This morning around 10 patient was walking outside, felt his legs get wobbly, and then fell straight down hitting his buttocks and R knee.    #Dizziness and fall after getting up from a sitting or supine position likely orthostatic  - Pt denied LOC  - CT spine: No evidence of acute cervical spine fracture or subluxation. Multilevel severe degenerative changes as described, with severe spinal noted stenosis at C2-3 and C3-4 and multilevel severe neural foraminal stenosis.  - CTAP:No definite evidence of acute traumatic injury within the chest, abdomen, or pelvis.   - CTH:  No evidence of acute intracranial hemorrhage. Probable chronic infarct within the right posterior frontal white matter. Lacunar infarcts within bilateral white matter and deep gray nuclei of indeterminate age. Mild/moderate chronic microvascular changes.  - Chest X-Ray negative for pneumothorax, infiltrate, or effusion. XR Pelvis negative for fx's or dislocations   - Trops Neg on admission, continue trending   - ECG NSR, Rpt   - f/u Echo   - Fall precautions   - Pt on tizanidine 4mg q8h - hold for now,   - obtain Orthostatics, adjust BBs if Orthostatics come back positive  - Follow up Neurology consult   - continue with tele monitoring for now   - patient still drives regularly, should not drive a vehicle     #CAD s/p CABG 2009  - 6/2021 :patent SALAZAR to LAD , patent SVG to OM , % , filled distally by collaterals from [facility].  - Continue DAPT ( Aspirin 81 mg daily and Plavix 75 mg daily ), ARB, Imdur,Ranexa, B-Blocker, Statin Therapy  - Cardio f/u OP     #HTN  - cont home meds     #HLD  - f/u lipid panel     #CVA - Left UE/ Left LE weakness/ left facial from prior CVA  - cont supportive care     #SAMANTA on CKD vs progression of CKD   - Cr 1.3 in 2017, 1.5 in 2019, 1.7 in June,   - Pt denies reduced PO intake   - Monitor   - Nephro consult if worsening       #DM  - Hold home meds  - Start Insulin regimen for FS >180      #DVT PPX : Hep SubQ  #Diet: DASH/TLC/CC  #GI PPX: Protonix  #Activity: Ambulate as tolerated  #Dispo: anticipate discharge home within 24-48h   FULL CODE  75 yo M with PMH of CAD s/p CABG x3 (Yessi), 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 Patient says he has been feeling intermittently dizzy over the past 3 days despite having adequate/normal PO intake. Patient fell yesterday at home after feeling dizzy hitting the back of his head, but with no LOC. No AC. This morning around 10 patient was walking outside, felt his legs get wobbly, and then fell straight down hitting his buttocks and R knee.    #Dizziness and fall after getting up from a sitting or supine position likely orthostatic  - Pt denied LOC  - CT spine: No evidence of acute cervical spine fracture or subluxation. Multilevel severe degenerative changes as described, with severe spinal noted stenosis at C2-3 and C3-4 and multilevel severe neural foraminal stenosis.  - CTAP:No definite evidence of acute traumatic injury within the chest, abdomen, or pelvis.   - CTH:  No evidence of acute intracranial hemorrhage. Probable chronic infarct within the right posterior frontal white matter. Lacunar infarcts within bilateral white matter and deep gray nuclei of indeterminate age. Mild/moderate chronic microvascular changes.  - Chest X-Ray negative for pneumothorax, infiltrate, or effusion. XR Pelvis negative for fx's or dislocations   - Trops Neg on admission, continue trending   - ECG NSR, Rpt   - f/u Echo   - Fall precautions   - Pt on tizanidine 4mg q8h - hold for now,   - Orthostats positive: losartan at night, dc flomax, Beta-Blocker therapy,  continue to adjust   - Follow up Neurology consult , MRA today   - continue with tele monitoring for now   - patient still drives regularly, should not drive a vehicle     #CAD s/p CABG 2009  - 6/2021 :patent SALAZAR to LAD , patent SVG to OM , % , filled distally by collaterals from [facility].  - Continue DAPT ( Aspirin 81 mg daily and Plavix 75 mg daily ), ARB, Imdur,Ranexa, B-Blocker, Statin Therapy  - Cardio f/u OP     #HTN  - cont home meds     #HLD  - f/u lipid panel     #CVA - Left UE/ Left LE weakness/ left facial from prior CVA  - cont supportive care     #SAMANTA on CKD vs progression of CKD   - Cr 1.3 in 2017, 1.5 in 2019, 1.7 in June,   - Pt denies reduced PO intake   - Monitor   - Nephro consult if worsening   - improving today continue IVF  - renal US      #DM  - Hold home meds  - Start Insulin regimen for FS >180    #depression/anxiety- effexor?    #Progress Note Handoff  Pending (specify):  follow up renal US, orthostats   Family discussion: house staff updated pt family  Disposition: f9    yes...

## 2024-11-03 LAB
ANION GAP SERPL CALC-SCNC: 12 MMOL/L — SIGNIFICANT CHANGE UP (ref 7–14)
BUN SERPL-MCNC: 8 MG/DL — SIGNIFICANT CHANGE UP (ref 7–23)
CALCIUM SERPL-MCNC: 8.9 MG/DL — SIGNIFICANT CHANGE UP (ref 8.4–10.5)
CHLORIDE SERPL-SCNC: 110 MMOL/L — HIGH (ref 98–107)
CO2 SERPL-SCNC: 18 MMOL/L — LOW (ref 22–31)
CREAT SERPL-MCNC: 0.66 MG/DL — SIGNIFICANT CHANGE UP (ref 0.5–1.3)
EGFR: 94 ML/MIN/1.73M2 — SIGNIFICANT CHANGE UP
GLUCOSE SERPL-MCNC: 82 MG/DL — SIGNIFICANT CHANGE UP (ref 70–99)
HCT VFR BLD CALC: 41.6 % — SIGNIFICANT CHANGE UP (ref 34.5–45)
HGB BLD-MCNC: 13.5 G/DL — SIGNIFICANT CHANGE UP (ref 11.5–15.5)
MAGNESIUM SERPL-MCNC: 2.2 MG/DL — SIGNIFICANT CHANGE UP (ref 1.6–2.6)
MCHC RBC-ENTMCNC: 30.8 PG — SIGNIFICANT CHANGE UP (ref 27–34)
MCHC RBC-ENTMCNC: 32.5 G/DL — SIGNIFICANT CHANGE UP (ref 32–36)
MCV RBC AUTO: 94.8 FL — SIGNIFICANT CHANGE UP (ref 80–100)
NRBC # BLD: 0 /100 WBCS — SIGNIFICANT CHANGE UP (ref 0–0)
NRBC # FLD: 0 K/UL — SIGNIFICANT CHANGE UP (ref 0–0)
PHOSPHATE SERPL-MCNC: 3.3 MG/DL — SIGNIFICANT CHANGE UP (ref 2.5–4.5)
PLATELET # BLD AUTO: 126 K/UL — LOW (ref 150–400)
POTASSIUM SERPL-MCNC: 3.8 MMOL/L — SIGNIFICANT CHANGE UP (ref 3.5–5.3)
POTASSIUM SERPL-SCNC: 3.8 MMOL/L — SIGNIFICANT CHANGE UP (ref 3.5–5.3)
RBC # BLD: 4.39 M/UL — SIGNIFICANT CHANGE UP (ref 3.8–5.2)
RBC # FLD: 13.6 % — SIGNIFICANT CHANGE UP (ref 10.3–14.5)
SODIUM SERPL-SCNC: 140 MMOL/L — SIGNIFICANT CHANGE UP (ref 135–145)
TSH RECEP AB FLD-ACNC: <1.1 IU/L — SIGNIFICANT CHANGE UP
WBC # BLD: 3.46 K/UL — LOW (ref 3.8–10.5)
WBC # FLD AUTO: 3.46 K/UL — LOW (ref 3.8–10.5)

## 2024-11-03 PROCEDURE — 76376 3D RENDER W/INTRP POSTPROCES: CPT | Mod: 26

## 2024-11-03 PROCEDURE — 93306 TTE W/DOPPLER COMPLETE: CPT | Mod: 26

## 2024-11-03 RX ADMIN — Medication 75 MICROGRAM(S): at 06:10

## 2024-11-03 RX ADMIN — ACETAZOLAMIDE EXTENDED-RELEASE 125 MILLIGRAM(S): 500 CAPSULE ORAL at 21:33

## 2024-11-03 RX ADMIN — Medication 81 MILLIGRAM(S): at 14:01

## 2024-11-03 RX ADMIN — Medication 1 DROP(S): at 06:12

## 2024-11-03 RX ADMIN — Medication 1 DROP(S): at 06:13

## 2024-11-03 RX ADMIN — DORZOLAMIDE HYDROCHLORIDE 1 DROP(S): 20 SOLUTION OPHTHALMIC at 17:14

## 2024-11-03 RX ADMIN — DORZOLAMIDE HYDROCHLORIDE 1 DROP(S): 20 SOLUTION OPHTHALMIC at 06:13

## 2024-11-03 RX ADMIN — Medication 10 MILLIGRAM(S): at 21:34

## 2024-11-03 RX ADMIN — ACETAZOLAMIDE EXTENDED-RELEASE 125 MILLIGRAM(S): 500 CAPSULE ORAL at 14:02

## 2024-11-03 RX ADMIN — Medication 1 DROP(S): at 17:15

## 2024-11-03 RX ADMIN — Medication 1 DROP(S): at 21:33

## 2024-11-03 RX ADMIN — ACETAZOLAMIDE EXTENDED-RELEASE 125 MILLIGRAM(S): 500 CAPSULE ORAL at 06:11

## 2024-11-03 RX ADMIN — Medication 40 MILLIGRAM(S): at 21:34

## 2024-11-03 RX ADMIN — Medication 1 DROP(S): at 17:13

## 2024-11-03 NOTE — PROGRESS NOTE ADULT - ASSESSMENT
70 y/o F with PMH of HTN, HLD, Hypothyroidism, prediabetes, Glaucoma (sp laser iritoectomy in past) who presented from her home in [facility] [place] after notable to have bradycardia to 40s on her routine OP physical exam on 10/25/24      Bradycardia Tele.fllow up Echo  Cards consulted  Avoid AV nidal blocking agents     Hypothyroidism   Continue with Synthroid

## 2024-11-03 NOTE — PROGRESS NOTE ADULT - SUBJECTIVE AND OBJECTIVE BOX
CARDIOLOGY FOLLOW UP - Dr. Sandra  Date of Service: 11-03-24 @ 07:23    CC: no cp/sob    Review of Systems:  Constitutional: No fever, weight loss, or fatigue  Respiratory: No cough, wheezing, or hemoptysis, no shortness of breath  Cardiovascular: No chest pain, palpitations, passing out, dizziness, or leg swelling  Gastrointestinal: No abd or epigastric pain. No nausea, vomiting, or hematemesis; no diarrhea or consiptaiton, no melena or hematochezia  Vascular: No edema     TELEMETRY:    PHYSICAL EXAM:  T(C): 36.6 (11-03-24 @ 05:00), Max: 36.6 (11-03-24 @ 05:00)  HR: 63 (11-03-24 @ 05:00) (61 - 67)  BP: 138/86 (11-03-24 @ 05:00) (98/54 - 138/86)  RR: 18 (11-03-24 @ 05:00) (16 - 18)  SpO2: 100% (11-03-24 @ 05:00) (98% - 100%)  Wt(kg): --  I&O's Summary      Appearance: Normal	  Cardiovascular: Normal S1 S2,RRR, No JVD, No murmurs  Respiratory: Lungs clear to auscultation	  Gastrointestinal:  Soft, Non-tender, + BS	  Extremities: Normal range of motion, No clubbing, cyanosis or edema  Vascular: Peripheral pulses palpable 2+ bilaterally       Home Medications:  acetaZOLAMIDE 125 mg oral tablet: 1 tab(s) orally 3 times a day (01 Nov 2024 16:21)  Aspir 81 oral delayed release tablet: 1 tab(s) orally once a day (01 Nov 2024 16:23)  brimonidine 0.2% ophthalmic solution: 1 drop(s) in each affected eye 2 times a day (01 Nov 2024 16:22)  Centrum Silver oral tablet: 1 tab(s) orally once a day (01 Nov 2024 16:23)  dorzolamide 2% ophthalmic solution: 1 drop(s) to each affected eye 2 times a day (01 Nov 2024 16:23)  hydroCHLOROthiazide 12.5 mg oral tablet: 1 tab(s) orally once a day (01 Nov 2024 16:21)  latanoprost 0.005% ophthalmic solution: 1 drop(s) to each affected eye once a day (at bedtime) (01 Nov 2024 16:23)  simvastatin 10 mg oral tablet: 1 tab(s) orally once a day (at bedtime) (01 Nov 2024 16:23)  Synthroid 88 mcg (0.088 mg) oral tablet: 1 tab(s) orally once a day (01 Nov 2024 16:23)  timolol maleate 0.5% ophthalmic solution: 1 drop(s) to each affected eye 2 times a day (01 Nov 2024 16:23)        MEDICATIONS  (STANDING):  acetaZOLAMIDE    Tablet 125 milliGRAM(s) Oral three times a day  aspirin enteric coated 81 milliGRAM(s) Oral daily  atorvastatin 10 milliGRAM(s) Oral at bedtime  brimonidine 0.2% Ophthalmic Solution 1 Drop(s) Both EYES two times a day  dorzolamide 2% Ophthalmic Solution 1 Drop(s) Both EYES <User Schedule>  enoxaparin Injectable 40 milliGRAM(s) SubCutaneous <User Schedule>  hydrochlorothiazide 12.5 milliGRAM(s) Oral daily  influenza  Vaccine (HIGH DOSE) 0.5 milliLiter(s) IntraMuscular once  latanoprost 0.005% Ophthalmic Solution 1 Drop(s) Both EYES at bedtime  levothyroxine 75 MICROGram(s) Oral daily  timolol 0.5% Solution 1 Drop(s) Both EYES <User Schedule>        EKG:  RADIOLOGY:  DIAGNOSTIC TESTING:  [ ] Echocardiogram:  [ ] Catherterization:  [ ] Stress Test:  OTHER:     LABS:	 	                          12.8   3.26  )-----------( 197      ( 02 Nov 2024 06:44 )             39.0     11-02    141  |  107  |  8   ----------------------------<  92  3.0[L]   |  22  |  0.72    Ca    8.7      02 Nov 2024 06:44  Phos  3.7     11-01  Mg     2.30     11-01    TPro  6.3  /  Alb  3.7  /  TBili  0.8  /  DBili  x   /  AST  24  /  ALT  21  /  AlkPhos  54  11-02          CARDIAC MARKERS:

## 2024-11-03 NOTE — PROGRESS NOTE ADULT - SUBJECTIVE AND OBJECTIVE BOX
HPI:  Ms. Fuentes is a 70 y/o F with PMH of HTN, HLD, Hypothyroidism, prediabetes, Glaucoma (sp laser iritoectomy in past) who presented from her home in [facility] [place] after notable to have bradycardia to 40s on her routine OP physical exam on 10/25/24. Pt went to her PCP in [facility] for routine annual physical where she was noted to have bradycardia to 48 on EKG. Pt and family arranged for the soonest flight they could to [place] to seek further medical care. Pt seen in [facility] with daughter Cady and reports no active cp, sob, fevers, chills, n/v/d/c, abd pain, palpitations, dizziness/lightheadedness. Also reports feeling overall without cardiac sx as well during her previous PCP visit too. Has been seeing a neurologist recently for unclear issue and was prescribed 2 medications, unsure of name (daughter to get the bottles). Has been otherwise complaints with meds and no other med changes. No toxic habits x3. Does report having increased weight loss from 160s-170s to 120s recently since April, no appetite issues reports, hematuria, hemoptysis, hematochezia/melena. no active rg or night sweats, reports some weakness/tiredness. Colonoscopy done 4 years in [facility] and self reports to be without issues. No other cardiac hx or hx of arrythmia in past, not symptomatic on ambulation.     In the ED, vitals notable for Bp in 170s/70s, HR in 50s, tele reviewed with HR ranging from 40s-50s. Labs reviewed and rfp wnl, cbc notable for WBC 3.59 rest of labs wnl, Trop wnl x1, TSH low at <0.10, crp wnl. covid/flu/rsv negative. EKG reviewed and notable for sinus bradycardia, HR in 50s. UA negative.     Pt admitted for further workup for asx bradycardia.  (01 Nov 2024 16:34)      PAST MEDICAL & SURGICAL HISTORY:  Glaucoma      HTN (hypertension)      HLD (hyperlipidemia)      Hypothyroidism      H/O abdominal hysterectomy      H/O laser iridotomy          Review of Systems:   CONSTITUTIONAL: No fever, weight loss, or fatigue  EYES: No eye pain, visual disturbances, or discharge  ENMT:  No difficulty hearing, tinnitus, vertigo; No sinus or throat pain  NECK: No pain or stiffness  BREASTS: No pain, masses, or nipple discharge  RESPIRATORY: No cough, wheezing, chills or hemoptysis; No shortness of breath  CARDIOVASCULAR: No chest pain, palpitations, dizziness, or leg swelling  GASTROINTESTINAL: No abdominal or epigastric pain. No nausea, vomiting, or hematemesis; No diarrhea or constipation. No melena or hematochezia.  GENITOURINARY: No dysuria, frequency, hematuria, or incontinence  NEUROLOGICAL: No headaches, memory loss, loss of strength, numbness, or tremors  SKIN: No itching, burning, rashes, or lesions   LYMPH NODES: No enlarged glands  ENDOCRINE: No heat or cold intolerance; No hair loss  MUSCULOSKELETAL: No joint pain or swelling; No muscle, back, or extremity pain  PSYCHIATRIC: No depression, anxiety, mood swings, or difficulty sleeping  HEME/LYMPH: No easy bruising, or bleeding gums  ALLERY AND IMMUNOLOGIC: No hives or eczema    Allergies    No Known Allergies    Intolerances        Social History:     FAMILY HISTORY:  No pertinent family history in first degree relatives        MEDICATIONS  (STANDING):  acetaZOLAMIDE    Tablet 125 milliGRAM(s) Oral three times a day  aspirin enteric coated 81 milliGRAM(s) Oral daily  atorvastatin 10 milliGRAM(s) Oral at bedtime  brimonidine 0.2% Ophthalmic Solution 1 Drop(s) Both EYES two times a day  dorzolamide 2% Ophthalmic Solution 1 Drop(s) Both EYES <User Schedule>  enoxaparin Injectable 40 milliGRAM(s) SubCutaneous <User Schedule>  hydrochlorothiazide 12.5 milliGRAM(s) Oral daily  influenza  Vaccine (HIGH DOSE) 0.5 milliLiter(s) IntraMuscular once  latanoprost 0.005% Ophthalmic Solution 1 Drop(s) Both EYES at bedtime  levothyroxine 75 MICROGram(s) Oral daily  timolol 0.5% Solution 1 Drop(s) Both EYES <User Schedule>    MEDICATIONS  (PRN):  acetaminophen     Tablet .. 650 milliGRAM(s) Oral every 6 hours PRN Temp greater or equal to 38C (100.4F), Mild Pain (1 - 3)  melatonin 3 milliGRAM(s) Oral at bedtime PRN Insomnia        CAPILLARY BLOOD GLUCOSE        I&O's Summary      PHYSICAL EXAM:  GENERAL: NAD, well-developed  HEAD:  Atraumatic, Normocephalic  EYES: EOMI, PERRLA, conjunctiva and sclera clear  NECK: Supple, No JVD  CHEST/LUNG: Clear to auscultation bilaterally; No wheeze  HEART: Regular rate and rhythm; No murmurs, rubs, or gallops  ABDOMEN: Soft, Nontender, Nondistended; Bowel sounds present  EXTREMITIES:  2+ Peripheral Pulses, No clubbing, cyanosis, or edema  PSYCH: AAOx3  NEUROLOGY: non-focal  SKIN: No rashes or lesions    LABS:                        12.8   3.26  )-----------( 197      ( 02 Nov 2024 06:44 )             39.0     11-02    141  |  107  |  8   ----------------------------<  92  3.0[L]   |  22  |  0.72    Ca    8.7      02 Nov 2024 06:44  Phos  3.7     11-01  Mg     2.30     11-01    TPro  6.3  /  Alb  3.7  /  TBili  0.8  /  DBili  x   /  AST  24  /  ALT  21  /  AlkPhos  54  11-02          Urinalysis Basic - ( 02 Nov 2024 06:44 )    Color: x / Appearance: x / SG: x / pH: x  Gluc: 92 mg/dL / Ketone: x  / Bili: x / Urobili: x   Blood: x / Protein: x / Nitrite: x   Leuk Esterase: x / RBC: x / WBC x   Sq Epi: x / Non Sq Epi: x / Bacteria: x        RADIOLOGY & ADDITIONAL TESTS:    Imaging Personally Reviewed:    Consultant(s) Notes Reviewed:      Care Discussed with Consultants/Other Providers:

## 2024-11-03 NOTE — PROGRESS NOTE ADULT - ASSESSMENT
Ms. Fuentes is a 70 y/o F with PMH of HTN, HLD, Hypothyroidism, prediabetes, Glaucoma (sp laser iritoectomy in past) who presented from her home in [facility] [place] after notable to have bradycardia to 40s on her routine OP physical exam    #Sinus Bradycardia  -stable  -no evidence of pathologic bradycardia  -ep eval appreciated  -plan for exercise stress  -TTE    #HTN  -cont outpt meds    #DM  -med eval noted    35 minutes spent on total encounter; more than 50% of the visit was spent counseling and/or coordinating care by the attending physician.      75 minutes spent on total encounter; more than 50% of the visit was spent counseling and/or coordinating care by the attending physician.

## 2024-11-04 LAB
ANION GAP SERPL CALC-SCNC: 14 MMOL/L — SIGNIFICANT CHANGE UP (ref 7–14)
BUN SERPL-MCNC: 18 MG/DL — SIGNIFICANT CHANGE UP (ref 7–23)
CALCIUM SERPL-MCNC: 8.7 MG/DL — SIGNIFICANT CHANGE UP (ref 8.4–10.5)
CHLORIDE SERPL-SCNC: 109 MMOL/L — HIGH (ref 98–107)
CO2 SERPL-SCNC: 17 MMOL/L — LOW (ref 22–31)
CREAT SERPL-MCNC: 0.78 MG/DL — SIGNIFICANT CHANGE UP (ref 0.5–1.3)
EGFR: 81 ML/MIN/1.73M2 — SIGNIFICANT CHANGE UP
GLUCOSE SERPL-MCNC: 75 MG/DL — SIGNIFICANT CHANGE UP (ref 70–99)
HCT VFR BLD CALC: 39 % — SIGNIFICANT CHANGE UP (ref 34.5–45)
HGB BLD-MCNC: 12.9 G/DL — SIGNIFICANT CHANGE UP (ref 11.5–15.5)
MAGNESIUM SERPL-MCNC: 2.1 MG/DL — SIGNIFICANT CHANGE UP (ref 1.6–2.6)
MCHC RBC-ENTMCNC: 30.9 PG — SIGNIFICANT CHANGE UP (ref 27–34)
MCHC RBC-ENTMCNC: 33.1 G/DL — SIGNIFICANT CHANGE UP (ref 32–36)
MCV RBC AUTO: 93.5 FL — SIGNIFICANT CHANGE UP (ref 80–100)
NRBC # BLD: 0 /100 WBCS — SIGNIFICANT CHANGE UP (ref 0–0)
NRBC # FLD: 0 K/UL — SIGNIFICANT CHANGE UP (ref 0–0)
PHOSPHATE SERPL-MCNC: 2.8 MG/DL — SIGNIFICANT CHANGE UP (ref 2.5–4.5)
PLATELET # BLD AUTO: 164 K/UL — SIGNIFICANT CHANGE UP (ref 150–400)
POTASSIUM SERPL-MCNC: 3.9 MMOL/L — SIGNIFICANT CHANGE UP (ref 3.5–5.3)
POTASSIUM SERPL-SCNC: 3.9 MMOL/L — SIGNIFICANT CHANGE UP (ref 3.5–5.3)
RBC # BLD: 4.17 M/UL — SIGNIFICANT CHANGE UP (ref 3.8–5.2)
RBC # FLD: 13.4 % — SIGNIFICANT CHANGE UP (ref 10.3–14.5)
SODIUM SERPL-SCNC: 140 MMOL/L — SIGNIFICANT CHANGE UP (ref 135–145)
WBC # BLD: 4.43 K/UL — SIGNIFICANT CHANGE UP (ref 3.8–10.5)
WBC # FLD AUTO: 4.43 K/UL — SIGNIFICANT CHANGE UP (ref 3.8–10.5)

## 2024-11-04 PROCEDURE — 99231 SBSQ HOSP IP/OBS SF/LOW 25: CPT | Mod: FS

## 2024-11-04 RX ORDER — CHLORHEXIDINE GLUCONATE 40 MG/ML
1 SOLUTION TOPICAL DAILY
Refills: 0 | Status: DISCONTINUED | OUTPATIENT
Start: 2024-11-04 | End: 2024-11-06

## 2024-11-04 RX ORDER — POTASSIUM CHLORIDE 10 MEQ
40 TABLET, EXTENDED RELEASE ORAL ONCE
Refills: 0 | Status: COMPLETED | OUTPATIENT
Start: 2024-11-04 | End: 2024-11-04

## 2024-11-04 RX ADMIN — Medication 81 MILLIGRAM(S): at 14:36

## 2024-11-04 RX ADMIN — Medication 1 DROP(S): at 22:00

## 2024-11-04 RX ADMIN — Medication 40 MILLIGRAM(S): at 22:01

## 2024-11-04 RX ADMIN — Medication 1 DROP(S): at 17:07

## 2024-11-04 RX ADMIN — ACETAZOLAMIDE EXTENDED-RELEASE 125 MILLIGRAM(S): 500 CAPSULE ORAL at 05:19

## 2024-11-04 RX ADMIN — Medication 75 MICROGRAM(S): at 04:06

## 2024-11-04 RX ADMIN — ACETAZOLAMIDE EXTENDED-RELEASE 125 MILLIGRAM(S): 500 CAPSULE ORAL at 14:35

## 2024-11-04 RX ADMIN — Medication 10 MILLIGRAM(S): at 22:05

## 2024-11-04 RX ADMIN — Medication 1 DROP(S): at 05:22

## 2024-11-04 RX ADMIN — CHLORHEXIDINE GLUCONATE 1 APPLICATION(S): 40 SOLUTION TOPICAL at 17:10

## 2024-11-04 RX ADMIN — DORZOLAMIDE HYDROCHLORIDE 1 DROP(S): 20 SOLUTION OPHTHALMIC at 17:08

## 2024-11-04 RX ADMIN — Medication 1 DROP(S): at 17:09

## 2024-11-04 RX ADMIN — Medication 1 DROP(S): at 05:20

## 2024-11-04 RX ADMIN — ACETAZOLAMIDE EXTENDED-RELEASE 125 MILLIGRAM(S): 500 CAPSULE ORAL at 22:01

## 2024-11-04 RX ADMIN — Medication 40 MILLIEQUIVALENT(S): at 14:35

## 2024-11-04 RX ADMIN — DORZOLAMIDE HYDROCHLORIDE 1 DROP(S): 20 SOLUTION OPHTHALMIC at 05:21

## 2024-11-04 NOTE — PROGRESS NOTE ADULT - SUBJECTIVE AND OBJECTIVE BOX
Patient is a 69y old  Female who presents with a chief complaint of Bradycardia (04 Nov 2024 11:23)      Subjective: No new complaints. Denies HA, lightheadedness, dizziness, CP, palpitation, SOB, abdominal pain, and N/V.        MEDICATIONS  (STANDING):  acetaZOLAMIDE    Tablet 125 milliGRAM(s) Oral three times a day  aspirin enteric coated 81 milliGRAM(s) Oral daily  atorvastatin 10 milliGRAM(s) Oral at bedtime  brimonidine 0.2% Ophthalmic Solution 1 Drop(s) Both EYES two times a day  dorzolamide 2% Ophthalmic Solution 1 Drop(s) Both EYES <User Schedule>  enoxaparin Injectable 40 milliGRAM(s) SubCutaneous <User Schedule>  hydrochlorothiazide 12.5 milliGRAM(s) Oral daily  influenza  Vaccine (HIGH DOSE) 0.5 milliLiter(s) IntraMuscular once  latanoprost 0.005% Ophthalmic Solution 1 Drop(s) Both EYES at bedtime  levothyroxine 75 MICROGram(s) Oral daily  potassium chloride   Powder 40 milliEquivalent(s) Oral once  timolol 0.5% Solution 1 Drop(s) Both EYES <User Schedule>    MEDICATIONS  (PRN):  acetaminophen     Tablet .. 650 milliGRAM(s) Oral every 6 hours PRN Temp greater or equal to 38C (100.4F), Mild Pain (1 - 3)  melatonin 3 milliGRAM(s) Oral at bedtime PRN Insomnia      Vital Signs Last 24 Hrs  T(C): 36.3 (04 Nov 2024 04:24), Max: 36.7 (03 Nov 2024 21:00)  T(F): 97.4 (04 Nov 2024 04:24), Max: 98 (03 Nov 2024 21:00)  HR: 72 (04 Nov 2024 04:24) (65 - 84)  BP: 126/74 (04 Nov 2024 04:24) (102/65 - 136/86)  BP(mean): --  RR: 18 (04 Nov 2024 04:24) (16 - 18)  SpO2: 100% (04 Nov 2024 04:24) (98% - 100%)    Parameters below as of 04 Nov 2024 04:24  Patient On (Oxygen Delivery Method): room air      I&O's Detail      Physical Exam:  GENERAL: Lying in bed, in NAD  HEART: S1S2 RRR; No murmurs, rubs, or gallops appreciated  PULMONARY: CTABL, normal respiratory effort.  No rales, wheezing, or rhonchi appreciated bilaterally. No use of accessory muscles  ABDOMEN: + Bowel sounds present, soft, NDNT  EXTREMITIES:  Warm, well -perfused, no pedal edema, distal pulses present    TELEMETRY:                            12.9   4.43  )-----------( 164      ( 04 Nov 2024 05:27 )             39.0       11-04    140  |  109[H]  |  18  ----------------------------<  75  3.9   |  17[L]  |  0.78    Ca    8.7      04 Nov 2024 05:27  Phos  2.8     11-04  Mg     2.10     11-04                       Patient is a 69y old  Female who presents with a chief complaint of Bradycardia (04 Nov 2024 11:23)      Subjective: No new complaints. Denies HA, lightheadedness, dizziness, CP, palpitation, SOB.        MEDICATIONS  (STANDING):  acetaZOLAMIDE    Tablet 125 milliGRAM(s) Oral three times a day  aspirin enteric coated 81 milliGRAM(s) Oral daily  atorvastatin 10 milliGRAM(s) Oral at bedtime  brimonidine 0.2% Ophthalmic Solution 1 Drop(s) Both EYES two times a day  dorzolamide 2% Ophthalmic Solution 1 Drop(s) Both EYES <User Schedule>  enoxaparin Injectable 40 milliGRAM(s) SubCutaneous <User Schedule>  hydrochlorothiazide 12.5 milliGRAM(s) Oral daily  influenza  Vaccine (HIGH DOSE) 0.5 milliLiter(s) IntraMuscular once  latanoprost 0.005% Ophthalmic Solution 1 Drop(s) Both EYES at bedtime  levothyroxine 75 MICROGram(s) Oral daily  potassium chloride   Powder 40 milliEquivalent(s) Oral once  timolol 0.5% Solution 1 Drop(s) Both EYES <User Schedule>    MEDICATIONS  (PRN):  acetaminophen     Tablet .. 650 milliGRAM(s) Oral every 6 hours PRN Temp greater or equal to 38C (100.4F), Mild Pain (1 - 3)  melatonin 3 milliGRAM(s) Oral at bedtime PRN Insomnia      Vital Signs Last 24 Hrs  T(C): 36.3 (04 Nov 2024 04:24), Max: 36.7 (03 Nov 2024 21:00)  T(F): 97.4 (04 Nov 2024 04:24), Max: 98 (03 Nov 2024 21:00)  HR: 72 (04 Nov 2024 04:24) (65 - 84)  BP: 126/74 (04 Nov 2024 04:24) (102/65 - 136/86)  BP(mean): --  RR: 18 (04 Nov 2024 04:24) (16 - 18)  SpO2: 100% (04 Nov 2024 04:24) (98% - 100%)    Parameters below as of 04 Nov 2024 04:24  Patient On (Oxygen Delivery Method): room air      I&O's Detail      Physical Exam:  GENERAL: Lying in bed, in NAD  HEART: S1S2 RRR; No murmurs, rubs, or gallops appreciated  PULMONARY: CTABL, normal respiratory effort.  No rales, wheezing, or rhonchi appreciated bilaterally. No use of accessory muscles  ABDOMEN: + Bowel sounds present, soft, NDNT  EXTREMITIES:  Warm, well -perfused, no pedal edema, distal pulses present    TELEMETRY:  sinus 50-80s, APC/PVC                            12.9   4.43  )-----------( 164      ( 04 Nov 2024 05:27 )             39.0       11-04    140  |  109[H]  |  18  ----------------------------<  75  3.9   |  17[L]  |  0.78    Ca    8.7      04 Nov 2024 05:27  Phos  2.8     11-04  Mg     2.10     11-04

## 2024-11-04 NOTE — PROGRESS NOTE ADULT - SUBJECTIVE AND OBJECTIVE BOX
CARDIOLOGY FOLLOW UP - Dr. Sandra  DATE OF SERVICE: 11/4/24     CC no cp or sob       REVIEW OF SYSTEMS:  CONSTITUTIONAL: No fever, weight loss, or fatigue  RESPIRATORY: No cough, wheezing, chills or hemoptysis; No Shortness of Breath  CARDIOVASCULAR: No chest pain, palpitations, passing out, dizziness  GASTROINTESTINAL: No abdominal or epigastric pain. No nausea, vomiting, or hematemesis; No diarrhea or constipation. No melena or hematochezia.      PHYSICAL EXAM:  T(C): 36.3 (11-04-24 @ 04:24), Max: 36.7 (11-03-24 @ 11:40)  HR: 72 (11-04-24 @ 04:24) (57 - 84)  BP: 126/74 (11-04-24 @ 04:24) (102/65 - 136/86)  RR: 18 (11-04-24 @ 04:24) (16 - 18)  SpO2: 100% (11-04-24 @ 04:24) (98% - 100%)  Wt(kg): --  I&O's Summary      Appearance: Normal	  Cardiovascular: Normal S1 S2,RRR, No JVD, No murmurs  Respiratory: Lungs clear to auscultation	  Gastrointestinal:  Soft, Non-tender, + BS	  Extremities: Normal range of motion, No clubbing, cyanosis or edema      Home Medications:  acetaZOLAMIDE 125 mg oral tablet: 1 tab(s) orally 3 times a day (01 Nov 2024 16:21)  Aspir 81 oral delayed release tablet: 1 tab(s) orally once a day (01 Nov 2024 16:23)  brimonidine 0.2% ophthalmic solution: 1 drop(s) in each affected eye 2 times a day (01 Nov 2024 16:22)  Centrum Silver oral tablet: 1 tab(s) orally once a day (01 Nov 2024 16:23)  dorzolamide 2% ophthalmic solution: 1 drop(s) to each affected eye 2 times a day (01 Nov 2024 16:23)  hydroCHLOROthiazide 12.5 mg oral tablet: 1 tab(s) orally once a day (01 Nov 2024 16:21)  latanoprost 0.005% ophthalmic solution: 1 drop(s) to each affected eye once a day (at bedtime) (01 Nov 2024 16:23)  simvastatin 10 mg oral tablet: 1 tab(s) orally once a day (at bedtime) (01 Nov 2024 16:23)  Synthroid 88 mcg (0.088 mg) oral tablet: 1 tab(s) orally once a day (01 Nov 2024 16:23)  timolol maleate 0.5% ophthalmic solution: 1 drop(s) to each affected eye 2 times a day (01 Nov 2024 16:23)      MEDICATIONS  (STANDING):  acetaZOLAMIDE    Tablet 125 milliGRAM(s) Oral three times a day  aspirin enteric coated 81 milliGRAM(s) Oral daily  atorvastatin 10 milliGRAM(s) Oral at bedtime  brimonidine 0.2% Ophthalmic Solution 1 Drop(s) Both EYES two times a day  dorzolamide 2% Ophthalmic Solution 1 Drop(s) Both EYES <User Schedule>  enoxaparin Injectable 40 milliGRAM(s) SubCutaneous <User Schedule>  hydrochlorothiazide 12.5 milliGRAM(s) Oral daily  influenza  Vaccine (HIGH DOSE) 0.5 milliLiter(s) IntraMuscular once  latanoprost 0.005% Ophthalmic Solution 1 Drop(s) Both EYES at bedtime  levothyroxine 75 MICROGram(s) Oral daily  potassium chloride   Powder 40 milliEquivalent(s) Oral once  timolol 0.5% Solution 1 Drop(s) Both EYES <User Schedule>      TELEMETRY: NSR 	    ECG:  	  RADIOLOGY:   DIAGNOSTIC TESTING:  [ ] Echocardiogram:  < from: TTE W or WO Ultrasound Enhancing Agent (11.03.24 @ 12:37) >  CONCLUSIONS:      1. Left ventricular cavityis normal in size. Left ventricular wall thickness is normal. Left ventricular systolic function is normal with an ejection fraction of 61 % by 3D. There are no regional wall motion abnormalities seen.   2. There is mild (grade 1) left ventricular diastolic dysfunction.   3. Normal right ventricular cavity size and normal right ventricular systolic function.   4. Structurally normal mitral valve with normal leaflet excursion. There is calcification of the mitral valve annulus. There is trace mitral regurgitation.    < end of copied text >    [ ] Stress Test:    < from: Cardiac Treadmill Stress Test (Non Imaging).. (11.02.24 @ 00:00) >    ---------------------------------------------------------------------------------------------------------------------------------------------------------  Conclusions:   1. Patient achieved 4 METS, which is consistent with poor exercise capacity considering age and other clinical characteristics.   2. The Duke Treadmill Score is 7.00, which is consistent with low risk (=5) for future cardiac events.    ---------------------------------------------------------------------------------------------------------------------------------------------------------     Stress Test Results:                Protocol: Modified Gibran           METS Achieved: 4       Exercise Duration: 7 min and 0 sec.              Heart Rate: Resting 85 bpm, Stress peak 131 bpm (88 % max predicted)             HR Response: Exaggerated.          Blood Pressure: Resting 140/71 mmHg, Stress max 158/82 mmHg             BP Response: Normal.       Exercise Capacity: Poor.  Stress Test Chest Pain: No chest pain  Symptoms During Stress: Fatigue.  Reason for Termination: Fatigue.    Duke Treadmill Score: low risk       +--------------+--------------+--------------+---------+-----------+  |Treadmill Time|  Treadmill   |  Treadmill   |  Heart  |   Blood   |  |              | Speed (mph)  |    Grade     |  Rate   | Pressure  |  |              |              |              |  (bpm)  |  (mmHg)   |  +--------------+--------------+--------------+---------+-----------+  |   Baseline   |   Standing   |     0.0%     |   85    |  140/71   |  +--------------+--------------+--------------+---------+-----------+  |   3:00 min   |     1.7      |     10%      |   101   |  148/74   |  +--------------+--------------+--------------+---------+-----------+  |   6:00 min   |     2.5      |     12%      |   126   |  158/82   |  +--------------+--------------+--------------+---------+-----------+  |   7:00 min   |     3.4      |     14%      |   129   |           |  +--------------+--------------+--------------+---------+-----------+  |Recovery 1:00 |--------------|--------------|   100   |  158/81   |  |     min      |              |              |         |           |  +--------------+--------------+--------------+---------+-----------+  |Recovery 2:00 |              |--------------|   78    |  157/82   |  |     min      |              |              |         |           |  +--------------+--------------+--------------+---------+-----------+  |Recovery 3:00 |--------------|--------------|   98    |  153/94   |  |     min      |              |              |         |           |  +--------------+--------------+--------------+---------+-----------+  |Recovery 4:00 |              |--------------|   76|  151/78   |  |     min      |              |              |         |           |  +--------------+--------------+--------------+---------+-----------+  |Recovery 4:50 |--------------|--------------|   75    |  143/76   |  |     min      |    |              |         |           |  +--------------+--------------+--------------+---------+-----------+    ---------------------------------------------------------------------------------------------------------------------------------------------------------Electrocardiogram:  Baseline electrocardiogram: Normal sinus rhythm at a rate of 85 bpm with T wave  abnormality lead III, aVF.  Stress electrocardiogram: No ischemic ST segment changes.  Arrhythmias: Occasional VPD occurred during stress and recovery.       Heart rate and blood pressure:  The heart rate response was exaggerated. The blood pressure response was normal.     Exercise Capacity:  The patient achieved 4 METS, which is consistent with poor exercise capacity.       < end of copied text >    OTHER: 	    LABS:	 	    Troponin T, High Sensitivity Result: 14 ng/L (11-02 @ 06:44)  Troponin T, High Sensitivity Result: 14 ng/L (11-01 @ 11:40)                          12.9   4.43  )-----------( 164      ( 04 Nov 2024 05:27 )             39.0     11-04    140  |  109[H]  |  18  ----------------------------<  75  3.9   |  17[L]  |  0.78    Ca    8.7      04 Nov 2024 05:27  Phos  2.8     11-04  Mg     2.10     11-04

## 2024-11-04 NOTE — PROGRESS NOTE ADULT - SUBJECTIVE AND OBJECTIVE BOX
HPI:  Ms. Fuentes is a 68 y/o F with PMH of HTN, HLD, Hypothyroidism, prediabetes, Glaucoma (sp laser iritoectomy in past) who presented from her home in [facility] [place] after notable to have bradycardia to 40s on her routine OP physical exam on 10/25/24. Pt went to her PCP in [facility] for routine annual physical where she was noted to have bradycardia to 48 on EKG. Pt and family arranged for the soonest flight they [place] to [place] further medical care. Pt seen in [facility] with daughter Cady and reports no active cp, sob, fevers, chills, n/v/d/c, abd pain, palpitations, dizziness/lightheadedness. Also reports feeling overall without cardiac sx as well during her previous PCP visit too. Has been seeing a neurologist recently for unclear issue and was prescribed 2 medications, unsure of name (daughter to get the bottles). Has been otherwise complaints with meds and no other med changes. No toxic habits x3. Does report having increased weight loss from 160s-170s to 120s recently since April, no appetite issues reports, hematuria, hemoptysis, hematochezia/melena. no active rg or night sweats, reports some weakness/tiredness. Colonoscopy done 4 years in [facility] and self reports to be without issues. No other cardiac hx or hx of arrythmia in past, not symptomatic on ambulation.     In the ED, vitals notable for Bp in 170s/70s, HR in 50s, tele reviewed with HR ranging from 40s-50s. Labs reviewed and rfp wnl, cbc notable for WBC 3.59 rest of labs wnl, Trop wnl x1, TSH low at <0.10, crp wnl. covid/flu/rsv negative. EKG reviewed and notable for sinus bradycardia, HR in 50s. UA negative.     Pt admitted for further workup for asx bradycardia.  (01 Nov 2024 16:34)      PAST MEDICAL & SURGICAL HISTORY:  Glaucoma      HTN (hypertension)      HLD (hyperlipidemia)      Hypothyroidism      H/O abdominal hysterectomy      H/O laser iridotomy          Review of Systems:   CONSTITUTIONAL: No fever, weight loss, or fatigue  EYES: No eye pain, visual disturbances, or discharge  ENMT:  No difficulty hearing, tinnitus, vertigo; No sinus or throat pain  NECK: No pain or stiffness  BREASTS: No pain, masses, or nipple discharge  RESPIRATORY: No cough, wheezing, chills or hemoptysis; No shortness of breath  CARDIOVASCULAR: No chest pain, palpitations, dizziness, or leg swelling  GASTROINTESTINAL: No abdominal or epigastric pain. No nausea, vomiting, or hematemesis; No diarrhea or constipation. No melena or hematochezia.  GENITOURINARY: No dysuria, frequency, hematuria, or incontinence  NEUROLOGICAL: No headaches, memory loss, loss of strength, numbness, or tremors  SKIN: No itching, burning, rashes, or lesions   LYMPH NODES: No enlarged glands  ENDOCRINE: No heat or cold intolerance; No hair loss  MUSCULOSKELETAL: No joint pain or swelling; No muscle, back, or extremity pain  PSYCHIATRIC: No depression, anxiety, mood swings, or difficulty sleeping  HEME/LYMPH: No easy bruising, or bleeding gums  ALLERY AND IMMUNOLOGIC: No hives or eczema    Allergies    No Known Allergies    Intolerances        Social History:     FAMILY HISTORY:  No pertinent family history in first degree relatives        MEDICATIONS  (STANDING):  acetaZOLAMIDE    Tablet 125 milliGRAM(s) Oral three times a day  aspirin enteric coated 81 milliGRAM(s) Oral daily  atorvastatin 10 milliGRAM(s) Oral at bedtime  brimonidine 0.2% Ophthalmic Solution 1 Drop(s) Both EYES two times a day  dorzolamide 2% Ophthalmic Solution 1 Drop(s) Both EYES <User Schedule>  enoxaparin Injectable 40 milliGRAM(s) SubCutaneous <User Schedule>  hydrochlorothiazide 12.5 milliGRAM(s) Oral daily  influenza  Vaccine (HIGH DOSE) 0.5 milliLiter(s) IntraMuscular once  latanoprost 0.005% Ophthalmic Solution 1 Drop(s) Both EYES at bedtime  levothyroxine 75 MICROGram(s) Oral daily  timolol 0.5% Solution 1 Drop(s) Both EYES <User Schedule>    MEDICATIONS  (PRN):  acetaminophen     Tablet .. 650 milliGRAM(s) Oral every 6 hours PRN Temp greater or equal to 38C (100.4F), Mild Pain (1 - 3)  melatonin 3 milliGRAM(s) Oral at bedtime PRN Insomnia        CAPILLARY BLOOD GLUCOSE        I&O's Summary      PHYSICAL EXAM:  GENERAL: NAD, well-developed  HEAD:  Atraumatic, Normocephalic  EYES: EOMI, PERRLA, conjunctiva and sclera clear  NECK: Supple, No JVD  CHEST/LUNG: Clear to auscultation bilaterally; No wheeze  HEART: Regular rate and rhythm; No murmurs, rubs, or gallops  ABDOMEN: Soft, Nontender, Nondistended; Bowel sounds present  EXTREMITIES:  2+ Peripheral Pulses, No clubbing, cyanosis, or edema  PSYCH: AAOx3  NEUROLOGY: non-focal  SKIN: No rashes or lesions    LABS:                        12.8   3.26  )-----------( 197      ( 02 Nov 2024 06:44 )             39.0     11-02    141  |  107  |  8   ----------------------------<  92  3.0[L]   |  22  |  0.72    Ca    8.7      02 Nov 2024 06:44  Phos  3.7     11-01  Mg     2.30     11-01    TPro  6.3  /  Alb  3.7  /  TBili  0.8  /  DBili  x   /  AST  24  /  ALT  21  /  AlkPhos  54  11-02          Urinalysis Basic - ( 02 Nov 2024 06:44 )    Color: x / Appearance: x / SG: x / pH: x  Gluc: 92 mg/dL / Ketone: x  / Bili: x / Urobili: x   Blood: x / Protein: x / Nitrite: x   Leuk Esterase: x / RBC: x / WBC x   Sq Epi: x / Non Sq Epi: x / Bacteria: x        RADIOLOGY & ADDITIONAL TESTS:    Imaging Personally Reviewed:    Consultant(s) Notes Reviewed:      Care Discussed with Consultants/Other Providers:

## 2024-11-04 NOTE — PROGRESS NOTE ADULT - ASSESSMENT
70 y/o F with PMH of HTN, HLD, Hypothyroidism, prediabetes, Glaucoma (sp laser iritoectomy in past) who presented from her home in [facility] [place] after notable to have bradycardia to 40s on her routine OP physical exam on 10/25/24      Bradycardia Tele.fllow up Echo  Cards consulted  Avoid AV nidal blocking agents   EP following   No intervention as per EP   Hypothyroidism   Continue with Synthroid     Rt Hepatic Lesion on CT   MRI abdomen

## 2024-11-04 NOTE — PROGRESS NOTE ADULT - ASSESSMENT
68 yo F hx HTN, Hypothyroid, Glaucoma presenting for concern for symptomatic bradycardia.    TTE: 11/3/24 LVEF 61%, mild grade1 DD. Trace MR.  Cardiac Treadmill: 11/2/24: 4 METS achieved peak HR 131bpm    Tele with bradycardia high 40s-low 50s with occasional PACs and PVCs. Denies symptoms currently or prior to arrival including with exertion. Unclear if was truly having SOB at PCP appointment on Thursday. Would obtain TTE as well as stress test to assess chronotropic response. TSH undetectable. Would obtain fT4 to r/o secondary hypothyroidism.    Recommendations:  - Continuous telemetric monitoring, past 24hrs reviewed: sinus 50-80s  - TTE reviewed LVEF 61%, no significant valvular issues  - Monitor electrolytes and replete K to 4 and Mg to 2  - f/u TSH: <1.10  - cardiac treadmill w/ good chronotropic response, peak HR 131bpm  - Continue care per primary team  - no indication for pacemaker at this time, will sign off, re-consult as needed      EP service [id number] 68 yo F hx HTN, Hypothyroid, Glaucoma presenting for concern for symptomatic bradycardia.    TTE: 11/3/24 LVEF 61%, mild grade1 DD. Trace MR.  Cardiac Treadmill: 11/2/24: 4 METS achieved peak HR 131bpm    1) Sinus Bradycardia    Recommendations:  - Continuous telemetric monitoring, past 24hrs reviewed: sinus 50-80s, APC/PVC  - TTE reviewed LVEF 61%, no significant valvular issues  - Monitor electrolytes and replete K to 4 and Mg to 2  - f/u TSH: <1.10  - cardiac treadmill w/ good chronotropic response, peak HR 131bpm  - Continue care per primary team  - no indication for pacemaker at this time, will sign off, re-consult as needed      EP service [id number]

## 2024-11-04 NOTE — PROGRESS NOTE ADULT - ASSESSMENT
Ms. Fuentes is a 70 y/o F with PMH of HTN, HLD, Hypothyroidism, prediabetes, Glaucoma (sp laser iritoectomy in past) who presented from her home in [facility] [place] after notable to have bradycardia to 40s on her routine OP physical exam    #Sinus Bradycardia  -stable-- in NSR    -no evidence of pathologic bradycardia  -ep eval appreciated  -exercise stress noted Patient achieved 4 METS, which is consistent with poor exercise capacity considering age and other clinical characteristics.No ischemic ST segment changes. : Occasional VPD occurred during stress and recovery.  -TTE nl LV sys fx    -EP TO FU    #HTN  -bp stable off meds     #DM  -med eval noted    DCP PENDING EP CLEARANCE

## 2024-11-04 NOTE — PROGRESS NOTE ADULT - NS ATTEND AMEND GEN_ALL_CORE FT
68 yo F hx HTN, Hypothyroid, Glaucoma presenting for concern for symptomatic bradycardia. TTE: 11/3/24 LVEF 61%, mild grade1 DD. Trace MR.  Cardiac Treadmill: 11/2/24: 4 METS achieved peak HR 131bpm. Cardiac treadmill demonstrated chronotropic response, peak HR 131bpm. No indication for pacemaker at this time, will sign off, re-consult as needed

## 2024-11-05 LAB
ALBUMIN SERPL ELPH-MCNC: 3.7 G/DL — SIGNIFICANT CHANGE UP (ref 3.3–5)
ALP SERPL-CCNC: 53 U/L — SIGNIFICANT CHANGE UP (ref 40–120)
ALT FLD-CCNC: 33 U/L — SIGNIFICANT CHANGE UP (ref 4–33)
ANION GAP SERPL CALC-SCNC: 10 MMOL/L — SIGNIFICANT CHANGE UP (ref 7–14)
AST SERPL-CCNC: 50 U/L — HIGH (ref 4–32)
BILIRUB DIRECT SERPL-MCNC: <0.2 MG/DL — SIGNIFICANT CHANGE UP (ref 0–0.3)
BILIRUB INDIRECT FLD-MCNC: >0.2 MG/DL — SIGNIFICANT CHANGE UP (ref 0–1)
BILIRUB SERPL-MCNC: 0.4 MG/DL — SIGNIFICANT CHANGE UP (ref 0.2–1.2)
BUN SERPL-MCNC: 11 MG/DL — SIGNIFICANT CHANGE UP (ref 7–23)
CALCIUM SERPL-MCNC: 9.1 MG/DL — SIGNIFICANT CHANGE UP (ref 8.4–10.5)
CHLORIDE SERPL-SCNC: 108 MMOL/L — HIGH (ref 98–107)
CO2 SERPL-SCNC: 21 MMOL/L — LOW (ref 22–31)
CREAT SERPL-MCNC: 0.67 MG/DL — SIGNIFICANT CHANGE UP (ref 0.5–1.3)
EGFR: 93 ML/MIN/1.73M2 — SIGNIFICANT CHANGE UP
GLUCOSE SERPL-MCNC: 82 MG/DL — SIGNIFICANT CHANGE UP (ref 70–99)
MAGNESIUM SERPL-MCNC: 2.2 MG/DL — SIGNIFICANT CHANGE UP (ref 1.6–2.6)
PHOSPHATE SERPL-MCNC: 2.9 MG/DL — SIGNIFICANT CHANGE UP (ref 2.5–4.5)
POTASSIUM SERPL-MCNC: 5.2 MMOL/L — SIGNIFICANT CHANGE UP (ref 3.5–5.3)
POTASSIUM SERPL-SCNC: 5.2 MMOL/L — SIGNIFICANT CHANGE UP (ref 3.5–5.3)
PROT SERPL-MCNC: 6.5 G/DL — SIGNIFICANT CHANGE UP (ref 6–8.3)
SODIUM SERPL-SCNC: 139 MMOL/L — SIGNIFICANT CHANGE UP (ref 135–145)

## 2024-11-05 RX ADMIN — Medication 75 MICROGRAM(S): at 04:07

## 2024-11-05 RX ADMIN — Medication 1 DROP(S): at 05:36

## 2024-11-05 RX ADMIN — Medication 1 DROP(S): at 17:26

## 2024-11-05 RX ADMIN — ACETAZOLAMIDE EXTENDED-RELEASE 125 MILLIGRAM(S): 500 CAPSULE ORAL at 21:44

## 2024-11-05 RX ADMIN — Medication 81 MILLIGRAM(S): at 14:02

## 2024-11-05 RX ADMIN — CHLORHEXIDINE GLUCONATE 1 APPLICATION(S): 40 SOLUTION TOPICAL at 14:03

## 2024-11-05 RX ADMIN — Medication 1 DROP(S): at 21:42

## 2024-11-05 RX ADMIN — ACETAZOLAMIDE EXTENDED-RELEASE 125 MILLIGRAM(S): 500 CAPSULE ORAL at 05:36

## 2024-11-05 RX ADMIN — Medication 40 MILLIGRAM(S): at 21:43

## 2024-11-05 RX ADMIN — Medication 10 MILLIGRAM(S): at 21:43

## 2024-11-05 RX ADMIN — DORZOLAMIDE HYDROCHLORIDE 1 DROP(S): 20 SOLUTION OPHTHALMIC at 17:25

## 2024-11-05 RX ADMIN — ACETAZOLAMIDE EXTENDED-RELEASE 125 MILLIGRAM(S): 500 CAPSULE ORAL at 14:02

## 2024-11-05 RX ADMIN — DORZOLAMIDE HYDROCHLORIDE 1 DROP(S): 20 SOLUTION OPHTHALMIC at 05:37

## 2024-11-05 NOTE — PROGRESS NOTE ADULT - ASSESSMENT
Ms. Fuentes is a 68 y/o F with PMH of HTN, HLD, Hypothyroidism, prediabetes, Glaucoma (sp laser iritoectomy in past) who presented from her home in [facility] [place] after notable to have bradycardia to 40s on her routine OP physical exam    #Sinus Bradycardia  -stable-- in NSR    -no evidence of pathologic bradycardia  -ep eval appreciated  -exercise stress noted Patient achieved 4 METS, which is consistent with poor exercise capacity considering age and other clinical characteristics.No ischemic ST segment changes. : Occasional VPD occurred during stress and recovery.  -TTE nl LV sys fx      #HTN  -bp stable off meds     #DM  -med eval noted    DCP PENDING MRI abdomen    35 minutes spent on total encounter; more than 50% of the visit was spent counseling and/or coordinating care by the attending physician.

## 2024-11-05 NOTE — PROGRESS NOTE ADULT - SUBJECTIVE AND OBJECTIVE BOX
CARDIOLOGY FOLLOW UP - Dr. Sandra  Date of Service: 11-05-24 @ 12:50    CC: no events    Review of Systems:  Constitutional: No fever, weight loss, or fatigue  Respiratory: No cough, wheezing, or hemoptysis, no shortness of breath  Cardiovascular: No chest pain, palpitations, passing out, dizziness, or leg swelling  Gastrointestinal: No abd or epigastric pain. No nausea, vomiting, or hematemesis; no diarrhea or consiptaiton, no melena or hematochezia  Vascular: No edema     TELEMETRY:    PHYSICAL EXAM:  T(C): 36.6 (11-05-24 @ 11:28), Max: 36.8 (11-05-24 @ 05:20)  HR: 68 (11-05-24 @ 11:28) (64 - 68)  BP: 122/69 (11-05-24 @ 11:28) (122/69 - 150/79)  RR: 18 (11-05-24 @ 11:28) (16 - 18)  SpO2: 100% (11-05-24 @ 11:28) (100% - 100%)  Wt(kg): --  I&O's Summary      Appearance: Normal	  Cardiovascular: Normal S1 S2,RRR, No JVD, No murmurs  Respiratory: Lungs clear to auscultation	  Gastrointestinal:  Soft, Non-tender, + BS	  Extremities: Normal range of motion, No clubbing, cyanosis or edema  Vascular: Peripheral pulses palpable 2+ bilaterally       Home Medications:  acetaZOLAMIDE 125 mg oral tablet: 1 tab(s) orally 3 times a day (01 Nov 2024 16:21)  Aspir 81 oral delayed release tablet: 1 tab(s) orally once a day (01 Nov 2024 16:23)  brimonidine 0.2% ophthalmic solution: 1 drop(s) in each affected eye 2 times a day (01 Nov 2024 16:22)  Centrum Silver oral tablet: 1 tab(s) orally once a day (01 Nov 2024 16:23)  dorzolamide 2% ophthalmic solution: 1 drop(s) to each affected eye 2 times a day (01 Nov 2024 16:23)  hydroCHLOROthiazide 12.5 mg oral tablet: 1 tab(s) orally once a day (01 Nov 2024 16:21)  latanoprost 0.005% ophthalmic solution: 1 drop(s) to each affected eye once a day (at bedtime) (01 Nov 2024 16:23)  simvastatin 10 mg oral tablet: 1 tab(s) orally once a day (at bedtime) (01 Nov 2024 16:23)  Synthroid 88 mcg (0.088 mg) oral tablet: 1 tab(s) orally once a day (01 Nov 2024 16:23)  timolol maleate 0.5% ophthalmic solution: 1 drop(s) to each affected eye 2 times a day (01 Nov 2024 16:23)        MEDICATIONS  (STANDING):  acetaZOLAMIDE    Tablet 125 milliGRAM(s) Oral three times a day  aspirin enteric coated 81 milliGRAM(s) Oral daily  atorvastatin 10 milliGRAM(s) Oral at bedtime  brimonidine 0.2% Ophthalmic Solution 1 Drop(s) Both EYES two times a day  chlorhexidine 2% Cloths 1 Application(s) Topical daily  dorzolamide 2% Ophthalmic Solution 1 Drop(s) Both EYES <User Schedule>  enoxaparin Injectable 40 milliGRAM(s) SubCutaneous <User Schedule>  hydrochlorothiazide 12.5 milliGRAM(s) Oral daily  influenza  Vaccine (HIGH DOSE) 0.5 milliLiter(s) IntraMuscular once  latanoprost 0.005% Ophthalmic Solution 1 Drop(s) Both EYES at bedtime  levothyroxine 75 MICROGram(s) Oral daily  timolol 0.5% Solution 1 Drop(s) Both EYES <User Schedule>        EKG:  RADIOLOGY:  DIAGNOSTIC TESTING:  [ ] Echocardiogram:  [ ] Catherterization:  [ ] Stress Test:  OTHER:     LABS:	 	                          12.9   4.43  )-----------( 164      ( 04 Nov 2024 05:27 )             39.0     11-05    139  |  108[H]  |  11  ----------------------------<  82  5.2   |  21[L]  |  0.67    Ca    9.1      05 Nov 2024 04:30  Phos  2.9     11-05  Mg     2.20     11-05    TPro  6.5  /  Alb  3.7  /  TBili  0.4  /  DBili  <0.2  /  AST  50[H]  /  ALT  33  /  AlkPhos  53  11-05          CARDIAC MARKERS:

## 2024-11-05 NOTE — PROGRESS NOTE ADULT - SUBJECTIVE AND OBJECTIVE BOX
HPI:  Ms. Fuentes is a 68 y/o F with PMH of HTN, HLD, Hypothyroidism, prediabetes, Glaucoma (sp laser iritoectomy in past) who presented from her home in [facility] [place] after notable to have bradycardia to 40s on her routine OP physical exam on 10/25/24. Pt went to her PCP in [facility] for routine annual physical where she was noted to have bradycardia to 48 on EKG. Pt and family arranged for the soonest flight they [place] to [place] further medical care. Pt seen in [facility] with daughter Cady and reports no active cp, sob, fevers, chills, n/v/d/c, abd pain, palpitations, dizziness/lightheadedness. Also reports feeling overall without cardiac sx as well during her previous PCP visit too. Has been seeing a neurologist recently for unclear issue and was prescribed 2 medications, unsure of name (daughter to get the bottles). Has been otherwise complaints with meds and no other med changes. No toxic habits x3. Does report having increased weight loss from 160s-170s to 120s recently since April, no appetite issues reports, hematuria, hemoptysis, hematochezia/melena. no active rg or night sweats, reports some weakness/tiredness. Colonoscopy done 4 years in [facility] and self reports to be without issues. No other cardiac hx or hx of arrythmia in past, not symptomatic on ambulation.     In the ED, vitals notable for Bp in 170s/70s, HR in 50s, tele reviewed with HR ranging from 40s-50s. Labs reviewed and rfp wnl, cbc notable for WBC 3.59 rest of labs wnl, Trop wnl x1, TSH low at <0.10, crp wnl. covid/flu/rsv negative. EKG reviewed and notable for sinus bradycardia, HR in 50s. UA negative.     Pt admitted for further workup for asx bradycardia.  (01 Nov 2024 16:34)      PAST MEDICAL & SURGICAL HISTORY:  Glaucoma      HTN (hypertension)      HLD (hyperlipidemia)      Hypothyroidism      H/O abdominal hysterectomy      H/O laser iridotomy          Review of Systems:   CONSTITUTIONAL: No fever, weight loss, or fatigue  EYES: No eye pain, visual disturbances, or discharge  ENMT:  No difficulty hearing, tinnitus, vertigo; No sinus or throat pain  NECK: No pain or stiffness  BREASTS: No pain, masses, or nipple discharge  RESPIRATORY: No cough, wheezing, chills or hemoptysis; No shortness of breath  CARDIOVASCULAR: No chest pain, palpitations, dizziness, or leg swelling  GASTROINTESTINAL: No abdominal or epigastric pain. No nausea, vomiting, or hematemesis; No diarrhea or constipation. No melena or hematochezia.  GENITOURINARY: No dysuria, frequency, hematuria, or incontinence  NEUROLOGICAL: No headaches, memory loss, loss of strength, numbness, or tremors  SKIN: No itching, burning, rashes, or lesions   LYMPH NODES: No enlarged glands  ENDOCRINE: No heat or cold intolerance; No hair loss  MUSCULOSKELETAL: No joint pain or swelling; No muscle, back, or extremity pain  PSYCHIATRIC: No depression, anxiety, mood swings, or difficulty sleeping  HEME/LYMPH: No easy bruising, or bleeding gums  ALLERY AND IMMUNOLOGIC: No hives or eczema    Allergies    No Known Allergies    Intolerances        Social History:     FAMILY HISTORY:  No pertinent family history in first degree relatives        T(C): 36.5 (11-05-24 @ 21:00), Max: 36.6 (11-05-24 @ 17:39)  HR: 67 (11-05-24 @ 21:00) (64 - 67)  BP: 102/61 (11-05-24 @ 21:00) (102/61 - 130/74)  RR: 15 (11-05-24 @ 21:00) (15 - 18)  SpO2: 100% (11-05-24 @ 21:00) (100% - 100%)      MEDICATIONS  (STANDING):  acetaZOLAMIDE    Tablet 125 milliGRAM(s) Oral three times a day  aspirin enteric coated 81 milliGRAM(s) Oral daily  atorvastatin 10 milliGRAM(s) Oral at bedtime  brimonidine 0.2% Ophthalmic Solution 1 Drop(s) Both EYES two times a day  chlorhexidine 2% Cloths 1 Application(s) Topical daily  dorzolamide 2% Ophthalmic Solution 1 Drop(s) Both EYES <User Schedule>  enoxaparin Injectable 40 milliGRAM(s) SubCutaneous <User Schedule>  hydrochlorothiazide 12.5 milliGRAM(s) Oral daily  influenza  Vaccine (HIGH DOSE) 0.5 milliLiter(s) IntraMuscular once  latanoprost 0.005% Ophthalmic Solution 1 Drop(s) Both EYES at bedtime  levothyroxine 75 MICROGram(s) Oral daily  timolol 0.5% Solution 1 Drop(s) Both EYES <User Schedule>    MEDICATIONS  (PRN):  acetaminophen     Tablet .. 650 milliGRAM(s) Oral every 6 hours PRN Temp greater or equal to 38C (100.4F), Mild Pain (1 - 3)  melatonin 3 milliGRAM(s) Oral at bedtime PRN Insomnia  PHYSICAL EXAM:  GENERAL: NAD, well-developed  HEAD:  Atraumatic, Normocephalic  EYES: EOMI, PERRLA, conjunctiva and sclera clear  NECK: Supple, No JVD  CHEST/LUNG: Clear to auscultation bilaterally; No wheeze  HEART: Regular rate and rhythm; No murmurs, rubs, or gallops  ABDOMEN: Soft, Nontender, Nondistended; Bowel sounds present  EXTREMITIES:  2+ Peripheral Pulses, No clubbing, cyanosis, or edema  PSYCH: AAOx3  NEUROLOGY: non-focal  SKIN: No rashes or lesions    LABS:                        12.8   3.26  )-----------( 197      ( 02 Nov 2024 06:44 )             39.0     11-02    141  |  107  |  8   ----------------------------<  92  3.0[L]   |  22  |  0.72    Ca    8.7      02 Nov 2024 06:44  Phos  3.7     11-01  Mg     2.30     11-01    TPro  6.3  /  Alb  3.7  /  TBili  0.8  /  DBili  x   /  AST  24  /  ALT  21  /  AlkPhos  54  11-02          Urinalysis Basic - ( 02 Nov 2024 06:44 )    Color: x / Appearance: x / SG: x / pH: x  Gluc: 92 mg/dL / Ketone: x  / Bili: x / Urobili: x   Blood: x / Protein: x / Nitrite: x   Leuk Esterase: x / RBC: x / WBC x   Sq Epi: x / Non Sq Epi: x / Bacteria: x        RADIOLOGY & ADDITIONAL TESTS:    Imaging Personally Reviewed:    Consultant(s) Notes Reviewed:      Care Discussed with Consultants/Other Providers:

## 2024-11-05 NOTE — PROGRESS NOTE ADULT - ASSESSMENT
68 y/o F with PMH of HTN, HLD, Hypothyroidism, prediabetes, Glaucoma (sp laser iritoectomy in past) who presented from her home in [facility] [place] after notable to have bradycardia to 40s on her routine OP physical exam on 10/25/24      Bradycardia Tele.fllow up Echo  Cards consulted  Avoid AV nidal blocking agents   EP following   No intervention as per EP   Hypothyroidism   Continue with Synthroid     Rt Hepatic Lesion on CT   MRI abdomen

## 2024-11-06 ENCOUNTER — TRANSCRIPTION ENCOUNTER (OUTPATIENT)
Age: 69
End: 2024-11-06

## 2024-11-06 VITALS
OXYGEN SATURATION: 100 % | TEMPERATURE: 97 F | SYSTOLIC BLOOD PRESSURE: 115 MMHG | DIASTOLIC BLOOD PRESSURE: 62 MMHG | HEART RATE: 70 BPM | RESPIRATION RATE: 18 BRPM

## 2024-11-06 PROCEDURE — 74183 MRI ABD W/O CNTR FLWD CNTR: CPT | Mod: 26

## 2024-11-06 RX ORDER — LEVOTHYROXINE SODIUM 300 MCG
1 TABLET ORAL
Qty: 30 | Refills: 0 | DISCHARGE
Start: 2024-11-06 | End: 2024-12-05

## 2024-11-06 RX ORDER — LEVOTHYROXINE SODIUM 88 MCG
1 TABLET ORAL
Qty: 30 | Refills: 0
Start: 2024-11-06 | End: 2024-12-05

## 2024-11-06 RX ADMIN — DORZOLAMIDE HYDROCHLORIDE 1 DROP(S): 20 SOLUTION OPHTHALMIC at 17:01

## 2024-11-06 RX ADMIN — ACETAZOLAMIDE EXTENDED-RELEASE 125 MILLIGRAM(S): 500 CAPSULE ORAL at 13:05

## 2024-11-06 RX ADMIN — Medication 1 DROP(S): at 05:22

## 2024-11-06 RX ADMIN — Medication 1 DROP(S): at 17:01

## 2024-11-06 RX ADMIN — CHLORHEXIDINE GLUCONATE 1 APPLICATION(S): 40 SOLUTION TOPICAL at 13:09

## 2024-11-06 RX ADMIN — Medication 81 MILLIGRAM(S): at 13:05

## 2024-11-06 RX ADMIN — DORZOLAMIDE HYDROCHLORIDE 1 DROP(S): 20 SOLUTION OPHTHALMIC at 05:21

## 2024-11-06 RX ADMIN — Medication 75 MICROGRAM(S): at 04:06

## 2024-11-06 RX ADMIN — ACETAZOLAMIDE EXTENDED-RELEASE 125 MILLIGRAM(S): 500 CAPSULE ORAL at 05:23

## 2024-11-06 NOTE — DISCHARGE NOTE PROVIDER - NSDCFUADDAPPT_GEN_ALL_CORE_FT
APPTS ARE READY TO BE MADE: [X] YES    Best Family or Patient Contact (if needed):    Additional Information about above appointments (if needed):    1: PCP  2: GI  3: Cardiology  4: Endocrinology    Other comments or requests:    APPTS ARE READY TO BE MADE: [X] YES    Best Family or Patient Contact (if needed):    Additional Information about above appointments (if needed):    1: PCP  2: GI  3: Cardiology  4: Endocrinology    Other comments or requests:     Patient informed us they already have secured a follow up appointment which is not visible on [facility] and declined to provide appointment details for PCP and Cardio appt.      Patient informed us they already have secured a follow up appointment which was not scheduled by our team. Patient has an appointment 5/5/25 at 2pm with DAMION AVILES,GILBERT MELENDREZ. [address] [place] [id number]. Office will follow up with patient directly for a sooner appointment

## 2024-11-06 NOTE — PROGRESS NOTE ADULT - ASSESSMENT
Ms. Fuentes is a 70 y/o F with PMH of HTN, HLD, Hypothyroidism, prediabetes, Glaucoma (sp laser iritoectomy in past) who presented from her home in [facility] [place] after notable to have bradycardia to 40s on her routine OP physical exam    #Sinus Bradycardia  -stable-- in NSR    -no evidence of pathologic bradycardia  -ep eval appreciated  -exercise stress noted Patient achieved 4 METS, which is consistent with poor exercise capacity considering age and other clinical characteristics.No ischemic ST segment changes. : Occasional VPD occurred during stress and recovery.  -TTE nl LV sys fx      #HTN  -bp stable -cw hctz     #DM  -med eval noted    #Rt Hepatic Lesion on CT   - MRI abdomen per MED     DCP PENDING MRI abdomen

## 2024-11-06 NOTE — PROGRESS NOTE ADULT - SUBJECTIVE AND OBJECTIVE BOX
CARDIOLOGY FOLLOW UP - Dr. Sandra  DATE OF SERVICE: 11/6/24    CC no cp or sob       REVIEW OF SYSTEMS:  CONSTITUTIONAL: No fever, weight loss, or fatigue  RESPIRATORY: No cough, wheezing, chills or hemoptysis; No Shortness of Breath  CARDIOVASCULAR: No chest pain, palpitations, passing out, dizziness  GASTROINTESTINAL: No abdominal or epigastric pain. No nausea, vomiting, or hematemesis; No diarrhea or constipation. No melena or hematochezia.      PHYSICAL EXAM:  T(C): 36.4 (11-06-24 @ 05:15), Max: 36.6 (11-05-24 @ 11:28)  HR: 64 (11-06-24 @ 05:15) (64 - 68)  BP: 138/71 (11-06-24 @ 05:15) (102/61 - 138/71)  RR: 16 (11-06-24 @ 05:15) (15 - 18)  SpO2: 100% (11-06-24 @ 05:15) (100% - 100%)  Wt(kg): --  I&O's Summary      Appearance: Normal	  Cardiovascular: Normal S1 S2,RRR, No JVD, No murmurs  Respiratory: Lungs clear to auscultation	  Gastrointestinal:  Soft, Non-tender, + BS	  Extremities: Normal range of motion, No clubbing, cyanosis or edema      Home Medications:  acetaZOLAMIDE 125 mg oral tablet: 1 tab(s) orally 3 times a day (01 Nov 2024 16:21)  Aspir 81 oral delayed release tablet: 1 tab(s) orally once a day (01 Nov 2024 16:23)  brimonidine 0.2% ophthalmic solution: 1 drop(s) in each affected eye 2 times a day (01 Nov 2024 16:22)  Centrum Silver oral tablet: 1 tab(s) orally once a day (01 Nov 2024 16:23)  dorzolamide 2% ophthalmic solution: 1 drop(s) to each affected eye 2 times a day (01 Nov 2024 16:23)  hydroCHLOROthiazide 12.5 mg oral tablet: 1 tab(s) orally once a day (01 Nov 2024 16:21)  latanoprost 0.005% ophthalmic solution: 1 drop(s) to each affected eye once a day (at bedtime) (01 Nov 2024 16:23)  simvastatin 10 mg oral tablet: 1 tab(s) orally once a day (at bedtime) (01 Nov 2024 16:23)  Synthroid 88 mcg (0.088 mg) oral tablet: 1 tab(s) orally once a day (01 Nov 2024 16:23)  timolol maleate 0.5% ophthalmic solution: 1 drop(s) to each affected eye 2 times a day (01 Nov 2024 16:23)      MEDICATIONS  (STANDING):  acetaZOLAMIDE    Tablet 125 milliGRAM(s) Oral three times a day  aspirin enteric coated 81 milliGRAM(s) Oral daily  atorvastatin 10 milliGRAM(s) Oral at bedtime  brimonidine 0.2% Ophthalmic Solution 1 Drop(s) Both EYES two times a day  chlorhexidine 2% Cloths 1 Application(s) Topical daily  dorzolamide 2% Ophthalmic Solution 1 Drop(s) Both EYES <User Schedule>  enoxaparin Injectable 40 milliGRAM(s) SubCutaneous <User Schedule>  hydrochlorothiazide 12.5 milliGRAM(s) Oral daily  influenza  Vaccine (HIGH DOSE) 0.5 milliLiter(s) IntraMuscular once  latanoprost 0.005% Ophthalmic Solution 1 Drop(s) Both EYES at bedtime  levothyroxine 75 MICROGram(s) Oral daily  timolol 0.5% Solution 1 Drop(s) Both EYES <User Schedule>      TELEMETRY: nsr  	    ECG:  	  RADIOLOGY:   DIAGNOSTIC TESTING:  [ ] Echocardiogram:  [ ]  Catheterization:  [ ] Stress Test:    OTHER: 	    LABS:	 	    Troponin T, High Sensitivity Result: 14 ng/L (11-02 @ 06:44)  Troponin T, High Sensitivity Result: 14 ng/L (11-01 @ 11:40)      11-05    139  |  108[H]  |  11  ----------------------------<  82  5.2   |  21[L]  |  0.67    Ca    9.1      05 Nov 2024 04:30  Phos  2.9     11-05  Mg     2.20     11-05    TPro  6.5  /  Alb  3.7  /  TBili  0.4  /  DBili  <0.2  /  AST  50[H]  /  ALT  33  /  AlkPhos  53  11-05

## 2024-11-06 NOTE — DISCHARGE NOTE PROVIDER - NSDCCPTREATMENT_GEN_ALL_CORE_FT
PRINCIPAL PROCEDURE  Procedure: MRI abdomen  Findings and Treatment: IMPRESSION:  A 1.3 cm right hepatic lobe lesion, most likely represents a hemangioma.   Additional indeterminant subcentimeter right hepatic lobe lesion,   possibly atypical hemangioma. Recommend follow-up MRI in 3 months to   evaluate for stability.

## 2024-11-06 NOTE — DISCHARGE NOTE PROVIDER - CARE PROVIDER_API CALL
PCP,   Phone: (   )    -  Fax: (   )    -  Follow Up Time:     Mamadou Sandra  Cardiovascular Disease  [address], [address]  [place] [address]  Phone: [phone]  Fax: [phone]  Follow Up Time:

## 2024-11-06 NOTE — PROGRESS NOTE ADULT - REASON FOR ADMISSION
Bradycardia
No abnormal movements

## 2024-11-06 NOTE — DISCHARGE NOTE PROVIDER - DETAILS OF MALNUTRITION DIAGNOSIS/DIAGNOSES
This patient has been assessed with a concern for Malnutrition and was treated during this hospitalization for the following Nutrition diagnosis/diagnoses:     -  11/02/2024: Severe protein-calorie malnutrition

## 2024-11-06 NOTE — PROGRESS NOTE ADULT - NUTRITIONAL ASSESSMENT
This patient has been assessed with a concern for Malnutrition and has been determined to have a diagnosis/diagnoses of Severe protein-calorie malnutrition.    This patient is being managed with:   Diet Regular-  DASH/TLC {Sodium & Cholesterol Restricted} (DASH)  No Caffeine  No Carbonated Beverages  No Chocolate  Supplement Feeding Modality:  Oral  Ensure Plus High Protein Cans or Servings Per Day:  1       Frequency:  Two Times a day  Entered: Nov 2 2024  3:32PM  
This patient has been assessed with a concern for Malnutrition and has been determined to have a diagnosis/diagnoses of Severe protein-calorie malnutrition.    This patient is being managed with:   Diet Regular-  DASH/TLC {Sodium & Cholesterol Restricted} (DASH)  Supplement Feeding Modality:  Oral  Ensure Plus High Protein Cans or Servings Per Day:  1       Frequency:  Two Times a day  Entered: Nov 4 2024  8:59AM  

## 2024-11-06 NOTE — DISCHARGE NOTE PROVIDER - NSDCMRMEDTOKEN_GEN_ALL_CORE_FT
acetaZOLAMIDE 125 mg oral tablet: 1 tab(s) orally 3 times a day  Aspir 81 oral delayed release tablet: 1 tab(s) orally once a day  brimonidine 0.2% ophthalmic solution: 1 drop(s) in each affected eye 2 times a day  Centrum Silver oral tablet: 1 tab(s) orally once a day  dorzolamide 2% ophthalmic solution: 1 drop(s) to each affected eye 2 times a day  hydroCHLOROthiazide 12.5 mg oral tablet: 1 tab(s) orally once a day  latanoprost 0.005% ophthalmic solution: 1 drop(s) to each affected eye once a day (at bedtime)  simvastatin 10 mg oral tablet: 1 tab(s) orally once a day (at bedtime)  Synthroid 88 mcg (0.088 mg) oral tablet: 1 tab(s) orally once a day  timolol maleate 0.5% ophthalmic solution: 1 drop(s) to each affected eye 2 times a day   acetaZOLAMIDE 125 mg oral tablet: 1 tab(s) orally 3 times a day  Aspir 81 oral delayed release tablet: 1 tab(s) orally once a day  brimonidine 0.2% ophthalmic solution: 1 drop(s) in each affected eye 2 times a day  Centrum Silver oral tablet: 1 tab(s) orally once a day  dorzolamide 2% ophthalmic solution: 1 drop(s) to each affected eye 2 times a day  hydroCHLOROthiazide 12.5 mg oral tablet: 1 tab(s) orally once a day  latanoprost 0.005% ophthalmic solution: 1 drop(s) to each affected eye once a day (at bedtime)  levothyroxine 75 mcg (0.075 mg) oral tablet: 1 tab(s) orally once a day  simvastatin 10 mg oral tablet: 1 tab(s) orally once a day (at bedtime)  timolol maleate 0.5% ophthalmic solution: 1 drop(s) to each affected eye 2 times a day

## 2024-11-06 NOTE — DISCHARGE NOTE NURSING/CASE MANAGEMENT/SOCIAL WORK - NSDCVIVACCINE_GEN_ALL_CORE_FT
Tdap; 20-May-2016 02:41; Rosaura England); [employer]; [id number]; IntraMuscular; Deltoid Right.; 0.5 milliLiter(s); VIS (VIS Published: 09-May-2013, VIS Presented: 20-May-2016);

## 2024-11-06 NOTE — DISCHARGE NOTE NURSING/CASE MANAGEMENT/SOCIAL WORK - FINANCIAL ASSISTANCE
[facility] provides services at a reduced cost to those who are determined to be eligible through [facility]’s financial assistance program. Information regarding [facility]’s financial assistance program can be found by going to [email or web address] or by calling [phone].

## 2024-11-06 NOTE — DISCHARGE NOTE PROVIDER - NSDCCPCAREPLAN_GEN_ALL_CORE_FT
PRINCIPAL DISCHARGE DIAGNOSIS  Diagnosis: Symptomatic bradycardia  Assessment and Plan of Treatment: Cardiology saw you for a slow heart rate. Stress test and echocardiogram were normal. Slow heart rate may have been caused by hypothyroidism. Follow up with cardiology outpatient and with pcp.      SECONDARY DISCHARGE DIAGNOSES  Diagnosis: Hypothyroidism  Assessment and Plan of Treatment: Endocrine decreased your synthroid from 88mcg to 75 mcg as your tsh was < 0.10 with slow heart rate and weight loss. Recheck your TSH and Free T4 in 4-6 weeks with outpatient provider    Diagnosis: Hepatic hemangioma  Assessment and Plan of Treatment: You had a likely benign lesion on right lobe of your liver, likely a hemangioma. Radiology recommends repeat mri in 3 months to ensure no changes in growth. Please follow up with a hepatologist/gastroenterologist

## 2024-11-06 NOTE — PROGRESS NOTE ADULT - SUBJECTIVE AND OBJECTIVE BOX
HPI:  Ms. Fuentes is a 68 y/o F with PMH of HTN, HLD, Hypothyroidism, prediabetes, Glaucoma (sp laser iritoectomy in past) who presented from her home in [facility] [place] after notable to have bradycardia to 40s on her routine OP physical exam on 10/25/24. Pt went to her PCP in [facility] for routine annual physical where she was noted to have bradycardia to 48 on EKG. Pt and family arranged for the soonest flight they could to [place] to seek further medical care. Pt seen in [facility] with daughter Cady and reports no active cp, sob, fevers, chills, n/v/d/c, abd pain, palpitations, dizziness/lightheadedness. Also reports feeling overall without cardiac sx as well during her previous PCP visit too. Has been seeing a neurologist recently for unclear issue and was prescribed 2 medications, unsure of name (daughter to get the bottles). Has been otherwise complaints with meds and no other med changes. No toxic habits x3. Does report having increased weight loss from 160s-170s to 120s recently since April, no appetite issues reports, hematuria, hemoptysis, hematochezia/melena. no active rg or night sweats, reports some weakness/tiredness. Colonoscopy done 4 years in [facility] and self reports to be without issues. No other cardiac hx or hx of arrythmia in past, not symptomatic on ambulation.     In the ED, vitals notable for Bp in 170s/70s, HR in 50s, tele reviewed with HR ranging from 40s-50s. Labs reviewed and rfp wnl, cbc notable for WBC 3.59 rest of labs wnl, Trop wnl x1, TSH low at <0.10, crp wnl. covid/flu/rsv negative. EKG reviewed and notable for sinus bradycardia, HR in 50s. UA negative.     Pt admitted for further workup for asx bradycardia.  (01 Nov 2024 16:34)      PAST MEDICAL & SURGICAL HISTORY:  Glaucoma      HTN (hypertension)      HLD (hyperlipidemia)      Hypothyroidism      H/O abdominal hysterectomy      H/O laser iridotomy          Review of Systems:   CONSTITUTIONAL: No fever, weight loss, or fatigue  EYES: No eye pain, visual disturbances, or discharge  ENMT:  No difficulty hearing, tinnitus, vertigo; No sinus or throat pain  NECK: No pain or stiffness  BREASTS: No pain, masses, or nipple discharge  RESPIRATORY: No cough, wheezing, chills or hemoptysis; No shortness of breath  CARDIOVASCULAR: No chest pain, palpitations, dizziness, or leg swelling  GASTROINTESTINAL: No abdominal or epigastric pain. No nausea, vomiting, or hematemesis; No diarrhea or constipation. No melena or hematochezia.  GENITOURINARY: No dysuria, frequency, hematuria, or incontinence  NEUROLOGICAL: No headaches, memory loss, loss of strength, numbness, or tremors  SKIN: No itching, burning, rashes, or lesions   LYMPH NODES: No enlarged glands  ENDOCRINE: No heat or cold intolerance; No hair loss  MUSCULOSKELETAL: No joint pain or swelling; No muscle, back, or extremity pain  PSYCHIATRIC: No depression, anxiety, mood swings, or difficulty sleeping  HEME/LYMPH: No easy bruising, or bleeding gums  ALLERY AND IMMUNOLOGIC: No hives or eczema    Allergies    No Known Allergies    Intolerances        Social History:     FAMILY HISTORY:  No pertinent family history in first degree relatives      T(C): 36.3 (11-06-24 @ 11:40), Max: 36.4 (11-06-24 @ 05:15)  HR: 70 (11-06-24 @ 11:40) (64 - 70)  BP: 115/62 (11-06-24 @ 11:40) (115/62 - 138/71)  RR: 18 (11-06-24 @ 11:40) (16 - 18)  SpO2: 100% (11-06-24 @ 11:40) (100% - 100%)      MEDICATIONS  (STANDING):  acetaZOLAMIDE    Tablet 125 milliGRAM(s) Oral three times a day  aspirin enteric coated 81 milliGRAM(s) Oral daily  atorvastatin 10 milliGRAM(s) Oral at bedtime  brimonidine 0.2% Ophthalmic Solution 1 Drop(s) Both EYES two times a day  chlorhexidine 2% Cloths 1 Application(s) Topical daily  dorzolamide 2% Ophthalmic Solution 1 Drop(s) Both EYES <User Schedule>  enoxaparin Injectable 40 milliGRAM(s) SubCutaneous <User Schedule>  hydrochlorothiazide 12.5 milliGRAM(s) Oral daily  influenza  Vaccine (HIGH DOSE) 0.5 milliLiter(s) IntraMuscular once  latanoprost 0.005% Ophthalmic Solution 1 Drop(s) Both EYES at bedtime  levothyroxine 75 MICROGram(s) Oral daily  timolol 0.5% Solution 1 Drop(s) Both EYES <User Schedule>    MEDICATIONS  (PRN):  acetaminophen     Tablet .. 650 milliGRAM(s) Oral every 6 hours PRN Temp greater or equal to 38C (100.4F), Mild Pain (1 - 3)  melatonin 3 milliGRAM(s) Oral at bedtime PRN Insomnia      PHYSICAL EXAM:  GENERAL: NAD, well-developed  HEAD:  Atraumatic, Normocephalic  EYES: EOMI, conjunctiva and sclera clear  NECK: Supple, No JVD  CHEST/LUNG: Clear to auscultation bilaterally; No wheeze  HEART: Regular rate and rhythm; No murmurs, rubs, or gallops  ABDOMEN: Soft, Nontender, Nondistended; Bowel sounds present  EXTREMITIES:  2+ Peripheral Pulses, No clubbing, cyanosis, or edema  PSYCH: AAOx3  NEUROLOGY: non-focal  SKIN: No rashes or lesions    no new labs

## 2024-11-06 NOTE — DISCHARGE NOTE PROVIDER - NSFOLLOWUPCLINICS_GEN_ALL_ED_FT
[facility] Gastroenterology  Gastroenterology  [address]  [place] [address]  Phone: [phone]  Fax:     [facility] [place]  Internal Medicine  [address] [place] [address]  Phone: [phone]  Fax: [phone]    [facility] Endocrinology  Endocrinology  [address] [place] [address]  Phone: [phone]  Fax:

## 2024-11-06 NOTE — DISCHARGE NOTE NURSING/CASE MANAGEMENT/SOCIAL WORK - NSDCPEFALRISK_GEN_ALL_CORE
For information on Fall & Injury Prevention, visit: [email or web address] OR  [email or web address] OR  https://www.cdc.gov/steadi/patient.html

## 2024-11-06 NOTE — DISCHARGE NOTE PROVIDER - HOSPITAL COURSE
Ms. Fuentes is a 68 y/o F with PMH of HTN, HLD, Hypothyroidism, prediabetes, Glaucoma (sp laser iritoectomy in past) who presented from her home in [facility] [place] after notable to have bradycardia to 40s on her routine OP physical exam    #Sinus Bradycardia  -stable-- in NSR    -no evidence of pathologic bradycardia  -ep eval appreciated  -exercise stress noted Patient achieved 4 METS, which is consistent with poor exercise capacity considering age and other clinical characteristics. No ischemic ST segment changes. : Occasional VPD occurred during stress and recovery.  -TTE nl LV sys fx      #HTN  -bp stable -cw hctz     Hypothyroidism.   - on levothyroxine 88mcg at home  - TSH <0.10 but with bradycardia + unintended weight loss  - endocrine: decrease levothyroxine to 75mcg daily, outpatient f/u   - patient to recheck TSH and Free T4 in 4-6 weeks with outpatient provider    #Rt Hepatic Lesion on CT   - MRI abdomen pending    Patient seen and evaluated, no acute somatic complaints. Reviewed discharge medications with patient; All new medications requiring new prescriptions were sent to the pharmacy of patient's choice. Reviewed need for prescription for previous home medications and new prescriptions sent if requested. Medically cleared/stable for discharge as per Dr. Sandra on 11/6/24 with close outpatient follow up within 1-2 weeks of discharge. Patient understands and agrees with plan of care. Ms. Fuentes is a 68 y/o F with PMH of HTN, HLD, Hypothyroidism, prediabetes, Glaucoma (sp laser iritoectomy in past) who presented from her home in [facility] [place] after notable to have bradycardia to 40s on her routine OP physical exam    #Sinus Bradycardia  -stable-- in NSR    -no evidence of pathologic bradycardia  -ep eval appreciated  -exercise stress noted Patient achieved 4 METS, which is consistent with poor exercise capacity considering age and other clinical characteristics. No ischemic ST segment changes. : Occasional VPD occurred during stress and recovery.  -TTE nl LV sys fx      #HTN  -bp stable -cw hctz     Hypothyroidism.   - on levothyroxine 88mcg at home  - TSH <0.10 but with bradycardia + unintended weight loss  - endocrine: decrease levothyroxine to 75mcg daily, outpatient f/u   - patient to recheck TSH and Free T4 in 4-6 weeks with outpatient provider    #Rt Hepatic Lesion on CT   - MRI abdomen   IMPRESSION: A 1.3 cm right hepatic lobe lesion, most likely represents a hemangioma.   Additional indeterminant subcentimeter right hepatic lobe lesion,   possibly atypical hemangioma. Recommend follow-up MRI in 3 months to   evaluate for stability.    Patient seen and evaluated, no acute somatic complaints. Reviewed discharge medications with patient; All new medications requiring new prescriptions were sent to the pharmacy of patient's choice. Reviewed need for prescription for previous home medications and new prescriptions sent if requested. Medically cleared/stable for discharge as per Dr. Sandra on 11/6/24 with close outpatient follow up within 1-2 weeks of discharge. Patient understands and agrees with plan of care.

## 2024-11-06 NOTE — PROGRESS NOTE ADULT - PROVIDER SPECIALTY LIST ADULT
Cardiology
Electrophysiology
Hospitalist

## 2024-11-06 NOTE — PROGRESS NOTE ADULT - TIME BILLING
Agree with above ACP note.  cv stable  hr improved  ex stress with appropriate inc in heart rate with exercise   echo with normal lv fxn  off AVN meds  dcp   eps f/u
agree with above  CV stable  awaiting MRI

## 2024-11-06 NOTE — DISCHARGE NOTE NURSING/CASE MANAGEMENT/SOCIAL WORK - PATIENT PORTAL LINK FT
You can access the FollowMyHealth Patient Portal offered by [facility] by registering at the following website: [email or web address]. By joining "[employer]’s FollowMyHealth portal, you will also be able to view your health information using other applications (apps) compatible with our system.

## 2024-11-06 NOTE — PROGRESS NOTE ADULT - ASSESSMENT
68 y/o F with PMH of HTN, HLD, Hypothyroidism, prediabetes, Glaucoma (sp laser iritoectomy in past) who presented from her home in [facility] [place] after notable to have bradycardia to 40s on her routine OP physical exam on 10/25/24      Bradycardia Tele.fllow up Echo  Cards consulted  Avoid AV nidal blocking agents   EP following   No intervention as per EP   Hypothyroidism   Continue with Synthroid     Rt Hepatic Lesion on CT   MRI abdomen shows Hemangioma   Follow up repeat MRI as out patient

## 2024-11-06 NOTE — DISCHARGE NOTE PROVIDER - CARE PROVIDERS DIRECT ADDRESSES
,[email or web address],[email or web address]

## 2024-11-06 NOTE — DISCHARGE NOTE PROVIDER - NSDCFUSCHEDAPPT_GEN_ALL_CORE_FT
Amy Huddleston  [facility]  ENDOCRIN [address]  Scheduled Appointment: 11/20/2024    [facility]  BRSTIMAG  [address]   Scheduled Appointment: 11/27/2024    [facility]  DIAGRAD [address]  Scheduled Appointment: 11/27/2024    Niall Noe  [facility]  GASTRO HORTA [id number]  Scheduled Appointment: 12/05/2024    Rickie Mcclure  [facility]  OPHTHALM [address]  Scheduled Appointment: 01/14/2025    [facility]  MRI  [address]  Scheduled Appointment: 01/31/2025    Nury Zhou  [facility]  INTMED  [address]  Scheduled Appointment: 02/13/2025

## 2024-11-06 NOTE — DISCHARGE NOTE NURSING/CASE MANAGEMENT/SOCIAL WORK - NSDCFUADDAPPT_GEN_ALL_CORE_FT
APPTS ARE READY TO BE MADE: [X] YES    Best Family or Patient Contact (if needed):    Additional Information about above appointments (if needed):    1: PCP  2: GI  3: Cardiology  4: Endocrinology    Other comments or requests:

## 2024-11-08 PROBLEM — E03.9 HYPOTHYROIDISM, UNSPECIFIED: Chronic | Status: ACTIVE | Noted: 2024-11-01

## 2024-11-11 ENCOUNTER — NON-APPOINTMENT (OUTPATIENT)
Age: 69
End: 2024-11-11

## 2024-11-11 ENCOUNTER — APPOINTMENT (OUTPATIENT)
Dept: GASTROENTEROLOGY | Facility: CLINIC | Age: 69
End: 2024-11-11
Payer: MEDICARE

## 2024-11-11 VITALS
BODY MASS INDEX: 23.74 KG/M2 | DIASTOLIC BLOOD PRESSURE: 82 MMHG | SYSTOLIC BLOOD PRESSURE: 136 MMHG | HEART RATE: 84 BPM | OXYGEN SATURATION: 97 % | HEIGHT: 62 IN | WEIGHT: 129 LBS

## 2024-11-11 DIAGNOSIS — R63.4 ABNORMAL WEIGHT LOSS: ICD-10-CM

## 2024-11-11 DIAGNOSIS — R93.2 ABNORMAL FINDINGS ON DIAGNOSTIC IMAGING OF LIVER AND BILIARY TRACT: ICD-10-CM

## 2024-11-11 PROCEDURE — 99204 OFFICE O/P NEW MOD 45 MIN: CPT

## 2024-11-11 RX ORDER — SODIUM SULFATE, POTASSIUM SULFATE AND MAGNESIUM SULFATE 1.6; 3.13; 17.5 G/177ML; G/177ML; G/177ML
17.5-3.13-1.6 SOLUTION ORAL
Qty: 2 | Refills: 0 | Status: ACTIVE | COMMUNITY
Start: 2024-11-11 | End: 1900-01-01

## 2024-11-12 ENCOUNTER — APPOINTMENT (OUTPATIENT)
Dept: INTERNAL MEDICINE | Facility: CLINIC | Age: 69
End: 2024-11-12

## 2024-11-13 NOTE — ASSESSMENT
[FreeTextEntry1] : 69F HTN, HLD, hypothyroidism, preDM2, glaucoma p/w 50 lb weight loss in the span of 6 months without GI symptoms as well as incidentally noted liver lesion.  #Weight loss - may be due to thyroid pathology but reasonable to pursue luminal evaluation to rule out GI malignancy, particularly since previous scope records are not available - discussed prep for procedure including need for CLD the day prior to procedure, avoidance of raw vegetables, nuts and seeds for one week leading up to procedure, and overnight split prep with Suprep, with the first half of the prep started between 6 and 7 pm and the second half of the prep started between 12:00 am and 2 am the day of the procedure. Discussed the need to be NPO at midnight apart from bowel prep, though small sips of water may be taken with morning meds. Discussed need to arrive one hour before scheduled procedure time and need for escort following procedure.   #Abnormal finding on imaging of liver - needs repeat MRI in late January

## 2024-11-13 NOTE — HISTORY OF PRESENT ILLNESS
[de-identified] : 11/02/2024 FINDINGS: CHEST: LUNGS AND LARGE AIRWAYS: Patent central airways. No pulmonary nodules. PLEURA: No pleural effusion. VESSELS: Within normal limits. HEART: Heart size is normal. No pericardial effusion. MEDIASTINUM AND CLIFTON: No lymphadenopathy. CHEST WALL AND LOWER NECK: Within normal limits.  ABDOMEN AND PELVIS: LIVER: Indeterminate lesion in segment 7 measuring 1.3 x 1.0 cm.  Subcentimeter lesions too small to characterize. BILE DUCTS: Normal caliber. GALLBLADDER: Within normal limits. SPLEEN: Within normal limits. PANCREAS: Within normal limits. ADRENALS: Within normal limits. KIDNEYS/URETERS: Within normal limits.  BLADDER: Within normal limits. REPRODUCTIVE ORGANS: Uterus and bilateral adnexa within normal limits.  BOWEL: No bowel obstruction. Appendix is normal. PERITONEUM/RETROPERITONEUM: Within normal limits. VESSELS: Atherosclerotic calcifications. LYMPH NODES: No lymphadenopathy. ABDOMINAL WALL: Within normal limits. BONES: Within normal limits.  IMPRESSION: Indeterminate lesion in the right hepatic lobe measuring up to 1.3 cm.  Lesion may be further evaluated with contrast-enhanced abdominal MRI.  Otherwise, no evidence of suspicious mass or adenopathy in the chest,  abdomen, or pelvis. [FreeTextEntry1] : 11/06/2024 FINDINGS: LOWER CHEST: Within normal limits.  LIVER: Normal morphology. No steatosis. A 1.3 cm T2 hyperintense lesion  in segment 7 with nodular focus of enhancement and delayed fill-in, most  likely represents a hemangioma. Additional 6 mm T2 isointense lesion in  segment 8 with delayed enhancement. No restricted diffusion.  Subcentimeter right hepatic lobe cyst. BILE DUCTS: Normal caliber. GALLBLADDER: Within normal limits. SPLEEN: Within normal limits. PANCREAS: Within normal limits. ADRENALS: Within normal limits. KIDNEYS/URETERS: Within normal limits.  VISUALIZED PORTIONS: BOWEL: Within normal limits. PERITONEUM: No ascites. VESSELS: Within normal limits. RETROPERITONEUM/LYMPH NODES: No lymphadenopathy. ABDOMINAL WALL: Within normal limits. BONES: Within normal limits.  IMPRESSION: A 1.3 cm right hepatic lobe lesion, most likely represents a hemangioma.  Additional indeterminant subcentimeter right hepatic lobe lesion,  possibly atypical hemangioma. Recommend follow-up MRI in 3 months to  evaluate for stability.

## 2024-11-13 NOTE — PHYSICAL EXAM
[No Acute Distress] : no acute distress [Sclera] : the sclera and conjunctiva were normal [No Respiratory Distress] : no respiratory distress [Heart Rate And Rhythm] : heart rate was normal and rhythm regular [Abdomen Tenderness] : non-tender [No Masses] : no abdominal mass palpated [Abdomen Soft] : soft [Rebound Tenderness] : no rebound tenderness [Oriented To Time, Place, And Person] : oriented to person, place, and time

## 2024-11-13 NOTE — HISTORY OF PRESENT ILLNESS
[de-identified] : 11/02/2024 FINDINGS: CHEST: LUNGS AND LARGE AIRWAYS: Patent central airways. No pulmonary nodules. PLEURA: No pleural effusion. VESSELS: Within normal limits. HEART: Heart size is normal. No pericardial effusion. MEDIASTINUM AND CLIFTON: No lymphadenopathy. CHEST WALL AND LOWER NECK: Within normal limits.  ABDOMEN AND PELVIS: LIVER: Indeterminate lesion in segment 7 measuring 1.3 x 1.0 cm.  Subcentimeter lesions too small to characterize. BILE DUCTS: Normal caliber. GALLBLADDER: Within normal limits. SPLEEN: Within normal limits. PANCREAS: Within normal limits. ADRENALS: Within normal limits. KIDNEYS/URETERS: Within normal limits.  BLADDER: Within normal limits. REPRODUCTIVE ORGANS: Uterus and bilateral adnexa within normal limits.  BOWEL: No bowel obstruction. Appendix is normal. PERITONEUM/RETROPERITONEUM: Within normal limits. VESSELS: Atherosclerotic calcifications. LYMPH NODES: No lymphadenopathy. ABDOMINAL WALL: Within normal limits. BONES: Within normal limits.  IMPRESSION: Indeterminate lesion in the right hepatic lobe measuring up to 1.3 cm.  Lesion may be further evaluated with contrast-enhanced abdominal MRI.  Otherwise, no evidence of suspicious mass or adenopathy in the chest,  abdomen, or pelvis. [FreeTextEntry1] : 11/06/2024 FINDINGS: LOWER CHEST: Within normal limits.  LIVER: Normal morphology. No steatosis. A 1.3 cm T2 hyperintense lesion  in segment 7 with nodular focus of enhancement and delayed fill-in, most  likely represents a hemangioma. Additional 6 mm T2 isointense lesion in  segment 8 with delayed enhancement. No restricted diffusion.  Subcentimeter right hepatic lobe cyst. BILE DUCTS: Normal caliber. GALLBLADDER: Within normal limits. SPLEEN: Within normal limits. PANCREAS: Within normal limits. ADRENALS: Within normal limits. KIDNEYS/URETERS: Within normal limits.  VISUALIZED PORTIONS: BOWEL: Within normal limits. PERITONEUM: No ascites. VESSELS: Within normal limits. RETROPERITONEUM/LYMPH NODES: No lymphadenopathy. ABDOMINAL WALL: Within normal limits. BONES: Within normal limits.  IMPRESSION: A 1.3 cm right hepatic lobe lesion, most likely represents a hemangioma.  Additional indeterminant subcentimeter right hepatic lobe lesion,  possibly atypical hemangioma. Recommend follow-up MRI in 3 months to  evaluate for stability.

## 2024-11-14 ENCOUNTER — APPOINTMENT (OUTPATIENT)
Dept: INTERNAL MEDICINE | Facility: CLINIC | Age: 69
End: 2024-11-14
Payer: MEDICARE

## 2024-11-14 ENCOUNTER — NON-APPOINTMENT (OUTPATIENT)
Age: 69
End: 2024-11-14

## 2024-11-14 ENCOUNTER — OUTPATIENT (OUTPATIENT)
Dept: OUTPATIENT SERVICES | Facility: HOSPITAL | Age: 69
LOS: 1 days | End: 2024-11-14

## 2024-11-14 VITALS
BODY MASS INDEX: 23 KG/M2 | WEIGHT: 125 LBS | SYSTOLIC BLOOD PRESSURE: 128 MMHG | HEIGHT: 62 IN | DIASTOLIC BLOOD PRESSURE: 70 MMHG | HEART RATE: 74 BPM | OXYGEN SATURATION: 98 %

## 2024-11-14 DIAGNOSIS — E78.2 MIXED HYPERLIPIDEMIA: ICD-10-CM

## 2024-11-14 DIAGNOSIS — I10 ESSENTIAL (PRIMARY) HYPERTENSION: ICD-10-CM

## 2024-11-14 DIAGNOSIS — H40.059 OCULAR HYPERTENSION, UNSPECIFIED EYE: ICD-10-CM

## 2024-11-14 DIAGNOSIS — E78.5 HYPERLIPIDEMIA, UNSPECIFIED: ICD-10-CM

## 2024-11-14 DIAGNOSIS — Z90.710 ACQUIRED ABSENCE OF BOTH CERVIX AND UTERUS: Chronic | ICD-10-CM

## 2024-11-14 DIAGNOSIS — H40.9 UNSPECIFIED GLAUCOMA: ICD-10-CM

## 2024-11-14 DIAGNOSIS — Z98.890 OTHER SPECIFIED POSTPROCEDURAL STATES: Chronic | ICD-10-CM

## 2024-11-14 DIAGNOSIS — Z00.00 ENCOUNTER FOR GENERAL ADULT MEDICAL EXAMINATION W/OUT ABNORMAL FINDINGS: ICD-10-CM

## 2024-11-14 PROBLEM — E03.9 HYPOTHYROIDISM: Status: ACTIVE | Noted: 2024-11-14

## 2024-11-14 PROCEDURE — G0438: CPT

## 2024-11-14 RX ORDER — TIMOLOL MALEATE 5 MG/ML
0.5 SOLUTION OPHTHALMIC
Qty: 2 | Refills: 2 | Status: ACTIVE | COMMUNITY
Start: 2024-11-14 | End: 1900-01-01

## 2024-11-14 RX ORDER — SIMVASTATIN 10 MG/1
10 TABLET, FILM COATED ORAL
Qty: 30 | Refills: 0 | Status: ACTIVE | COMMUNITY
Start: 2024-11-14 | End: 1900-01-01

## 2024-11-14 RX ORDER — ACETAZOLAMIDE 125 MG/1
125 TABLET ORAL 3 TIMES DAILY
Qty: 90 | Refills: 0 | Status: ACTIVE | COMMUNITY
Start: 2024-11-14 | End: 1900-01-01

## 2024-11-14 RX ORDER — LEVOTHYROXINE SODIUM 0.07 MG/1
75 TABLET ORAL EVERY MORNING
Qty: 30 | Refills: 2 | Status: ACTIVE | COMMUNITY
Start: 2024-11-14 | End: 1900-01-01

## 2024-11-14 RX ORDER — DORZOLAMIDE HYDROCHLORIDE 20 MG/ML
2 SOLUTION OPHTHALMIC TWICE DAILY
Qty: 2 | Refills: 2 | Status: ACTIVE | COMMUNITY
Start: 2024-11-14 | End: 1900-01-01

## 2024-11-14 RX ORDER — LATANOPROST/PF 0.005 %
0.01 DROPS OPHTHALMIC (EYE) DAILY
Qty: 2 | Refills: 2 | Status: ACTIVE | COMMUNITY
Start: 2024-11-14 | End: 1900-01-01

## 2024-11-14 RX ORDER — HYDROCHLOROTHIAZIDE 12.5 MG/1
12.5 CAPSULE ORAL DAILY
Qty: 30 | Refills: 2 | Status: ACTIVE | COMMUNITY
Start: 2024-11-14 | End: 1900-01-01

## 2024-11-14 RX ORDER — BRIMONIDINE TARTRATE 2 MG/MG
0.2 SOLUTION/ DROPS OPHTHALMIC
Qty: 2 | Refills: 2 | Status: ACTIVE | COMMUNITY
Start: 2024-11-14 | End: 1900-01-01

## 2024-11-14 NOTE — ASSESSMENT
[FreeTextEntry1] : # Bradycardia  Pt's HR today is in the 70s, regular inpatient stress test was wnl  TTE wnl    # Primary HTN  - C/ w/ HCTZ 12.5 mg daily  - c/ w/ aspirin 81 mg daily, pt was told that it is not indicated and that she can stop using it, but pt stated that she wants to take it. risks discussed   # Hyperlipidemia  - c/ w/ simvastatin 10 mg daily   # Hypothyroidism: Thyroid abnormalities noted during hospitalization with TSH <0.10, free thyroxine 2.0, thyroglobulin <0.10, anti-thyroglobulin abs 3652 Pt stated that initially was on 88 mcg levothyroxine, now decreased to 75 mcg during hospitalization c/ w/ levothyroxine 75 mcg daily 1 hr before breakfast on empty stomach Endo follow up   # Bilateral eye glaucoma  - Saw an ophthalmologist in [facility] - does not have any eye drops on her hence requested to be sent  - c/ w/ brimonidine - c/ w/ acetazolamide - c/ w/ latanoprost - c/ w/ dorzolamide - c/ w/ timolol   HEALTHCARE MAINTENANCE Follow up CBC, CMP Hba1c Lipid panel TSH HIV  # Annual Screening:  Referral to gyn for pap smear: Pt does not remember the last one, will bring record Screening mammogram: sent referral Colonoscopy: scheduled for december DEXA scan: sent referral   # Vaccine: REFUSED today as patient is feeling under the weather Flu vaccine:  covid vaccine: Pneumonia Vacc:  Herpes Zoster:   # Lifestyle modifications and patient Education: Counseling on safe sex practices Avoid cigarette smoking  Avoid drinking alcohol  No illicit drug use

## 2024-11-14 NOTE — HISTORY OF PRESENT ILLNESS
[FreeTextEntry1] : establish primary care   lost 50 lbs since april  [de-identified] : Ms. Hurt is a 70 y/o F with PMH of HTN, HLD, hypothyroidism, bradycardia, prediabetes, glaucoma (s/p laser iritoectomy in past) who was recently hospitalized at [facility] from 11/1-11/6 for bradycardia. Pt presented from her home in [facility] to the [place] for further care after she was told by her PCP in [facility] to have bradycardia to 40s on her routine physical exam. During the inpatient course, pt was in NSR, no evidence of pathologic bradycardia. exercise stress test noted Patient achieved 4 METS, which is consistent with poor exercise capacity considering age and other clinical characteristics. No concern for ischemic ST segment changes. Occasional VPD occurred during stress and recovery. TTE is within normal limits, EF of 61%. In addition, pt also stated that she lost 50lb weight loss over the past 6-7 months due to poor oral intake, no abdominal pain, nausea or vomiting/melena/hematochezia. CTAP neg for malignancy, but showed a lesion In the liver for which MRI abdomen was obtained which showed hemangioma.  Pt was seen by GI, repeat MRI in 3 months.   Today pt states that she has runny nose, myalgia and sore throat since last night. Per daughter, her daughter got cold like symptoms after she returned from school. No fever/chills/ cough/ chest pain/sob/palpitations/N/V/abdominal pain/sensory and motor weakness or any acute symptoms other than stated above

## 2024-11-20 ENCOUNTER — APPOINTMENT (OUTPATIENT)
Dept: ENDOCRINOLOGY | Facility: CLINIC | Age: 69
End: 2024-11-20
Payer: MEDICARE

## 2024-11-20 VITALS
DIASTOLIC BLOOD PRESSURE: 77 MMHG | OXYGEN SATURATION: 98 % | BODY MASS INDEX: 23.37 KG/M2 | HEIGHT: 62 IN | HEART RATE: 78 BPM | SYSTOLIC BLOOD PRESSURE: 121 MMHG | WEIGHT: 127 LBS

## 2024-11-20 DIAGNOSIS — E03.9 HYPOTHYROIDISM, UNSPECIFIED: ICD-10-CM

## 2024-11-20 PROCEDURE — 99204 OFFICE O/P NEW MOD 45 MIN: CPT

## 2024-11-20 NOTE — PHYSICAL EXAM
[Alert] : alert [Normal Sclera/Conjunctiva] : normal sclera/conjunctiva [Normal Outer Ear/Nose] : the ears and nose were normal in appearance [No Neck Mass] : no neck mass was observed [No Respiratory Distress] : no respiratory distress [Normal PMI] : the apical impulse was normal [Normal Bowel Sounds] : normal bowel sounds [No Stigmata of Cushings Syndrome] : no stigmata of Cushings Syndrome [No Rash] : no rash [Oriented x3] : oriented to person, place, and time

## 2024-11-20 NOTE — HISTORY OF PRESENT ILLNESS
[FreeTextEntry1] :  70 y/o F with PMH of HTN, HLD, Hypothyroidism, prediabetes, Glaucoma (sp laser iritoectomy in past) who presented from her home in [facility] [place] after notable to have bradycardia to 40s on her routine exam  She reports having a longstanding hx of Hypothyroidism.  She has been on Levothyroxine 88 mcg, 1 tab daily  Recently during hospital admission, the dose of the Levothyroxine was decreased to 75 mcg, 1 tab daily  She reports taking it daily in AM with water only,

## 2024-11-27 ENCOUNTER — OUTPATIENT (OUTPATIENT)
Dept: OUTPATIENT SERVICES | Facility: HOSPITAL | Age: 69
LOS: 1 days | End: 2024-11-27
Payer: MEDICARE

## 2024-11-27 ENCOUNTER — RESULT REVIEW (OUTPATIENT)
Age: 69
End: 2024-11-27

## 2024-11-27 ENCOUNTER — APPOINTMENT (OUTPATIENT)
Dept: RADIOLOGY | Facility: CLINIC | Age: 69
End: 2024-11-27
Payer: MEDICARE

## 2024-11-27 ENCOUNTER — APPOINTMENT (OUTPATIENT)
Dept: MAMMOGRAPHY | Facility: CLINIC | Age: 69
End: 2024-11-27
Payer: MEDICARE

## 2024-11-27 DIAGNOSIS — Z98.890 OTHER SPECIFIED POSTPROCEDURAL STATES: Chronic | ICD-10-CM

## 2024-11-27 DIAGNOSIS — Z90.710 ACQUIRED ABSENCE OF BOTH CERVIX AND UTERUS: Chronic | ICD-10-CM

## 2024-11-27 DIAGNOSIS — Z00.8 ENCOUNTER FOR OTHER GENERAL EXAMINATION: ICD-10-CM

## 2024-11-27 DIAGNOSIS — R92.0 MAMMOGRAPHIC MICROCALCIFICATION FOUND ON DIAGNOSTIC IMAGING OF BREAST: ICD-10-CM

## 2024-11-27 PROCEDURE — 77067 SCR MAMMO BI INCL CAD: CPT | Mod: 26

## 2024-11-27 PROCEDURE — 77067 SCR MAMMO BI INCL CAD: CPT

## 2024-11-27 PROCEDURE — 77063 BREAST TOMOSYNTHESIS BI: CPT

## 2024-11-27 PROCEDURE — 77085 DXA BONE DENSITY AXL VRT FX: CPT

## 2024-11-27 PROCEDURE — 77080 DXA BONE DENSITY AXIAL: CPT | Mod: 26

## 2024-11-27 PROCEDURE — 77063 BREAST TOMOSYNTHESIS BI: CPT | Mod: 26

## 2024-11-27 PROCEDURE — 77080 DXA BONE DENSITY AXIAL: CPT

## 2024-12-02 ENCOUNTER — APPOINTMENT (OUTPATIENT)
Dept: MAMMOGRAPHY | Facility: CLINIC | Age: 69
End: 2024-12-02
Payer: MEDICARE

## 2024-12-02 ENCOUNTER — RESULT REVIEW (OUTPATIENT)
Age: 69
End: 2024-12-02

## 2024-12-02 ENCOUNTER — OUTPATIENT (OUTPATIENT)
Dept: OUTPATIENT SERVICES | Facility: HOSPITAL | Age: 69
LOS: 1 days | End: 2024-12-02
Payer: MEDICARE

## 2024-12-02 ENCOUNTER — APPOINTMENT (OUTPATIENT)
Dept: ULTRASOUND IMAGING | Facility: CLINIC | Age: 69
End: 2024-12-02
Payer: MEDICARE

## 2024-12-02 DIAGNOSIS — Z90.710 ACQUIRED ABSENCE OF BOTH CERVIX AND UTERUS: Chronic | ICD-10-CM

## 2024-12-02 DIAGNOSIS — Z98.890 OTHER SPECIFIED POSTPROCEDURAL STATES: Chronic | ICD-10-CM

## 2024-12-02 DIAGNOSIS — R92.0 MAMMOGRAPHIC MICROCALCIFICATION FOUND ON DIAGNOSTIC IMAGING OF BREAST: ICD-10-CM

## 2024-12-02 PROCEDURE — G0279: CPT

## 2024-12-02 PROCEDURE — G0279: CPT | Mod: 26

## 2024-12-02 PROCEDURE — 77065 DX MAMMO INCL CAD UNI: CPT

## 2024-12-02 PROCEDURE — 76642 ULTRASOUND BREAST LIMITED: CPT | Mod: 26,RT

## 2024-12-02 PROCEDURE — 77065 DX MAMMO INCL CAD UNI: CPT | Mod: 26,RT

## 2024-12-02 PROCEDURE — 76642 ULTRASOUND BREAST LIMITED: CPT

## 2024-12-05 ENCOUNTER — APPOINTMENT (OUTPATIENT)
Dept: GASTROENTEROLOGY | Facility: HOSPITAL | Age: 69
End: 2024-12-05

## 2024-12-13 ENCOUNTER — TRANSCRIPTION ENCOUNTER (OUTPATIENT)
Age: 69
End: 2024-12-13

## 2024-12-13 ENCOUNTER — APPOINTMENT (OUTPATIENT)
Dept: GASTROENTEROLOGY | Facility: HOSPITAL | Age: 69
End: 2024-12-13

## 2024-12-13 ENCOUNTER — OUTPATIENT (OUTPATIENT)
Dept: OUTPATIENT SERVICES | Facility: HOSPITAL | Age: 69
LOS: 1 days | End: 2024-12-13
Payer: MEDICARE

## 2024-12-13 ENCOUNTER — RESULT REVIEW (OUTPATIENT)
Age: 69
End: 2024-12-13

## 2024-12-13 VITALS
RESPIRATION RATE: 17 BRPM | HEIGHT: 63 IN | DIASTOLIC BLOOD PRESSURE: 89 MMHG | OXYGEN SATURATION: 99 % | SYSTOLIC BLOOD PRESSURE: 192 MMHG | WEIGHT: 121.92 LBS | HEART RATE: 60 BPM | TEMPERATURE: 98 F

## 2024-12-13 VITALS
RESPIRATION RATE: 22 BRPM | DIASTOLIC BLOOD PRESSURE: 82 MMHG | HEART RATE: 52 BPM | SYSTOLIC BLOOD PRESSURE: 170 MMHG | OXYGEN SATURATION: 99 %

## 2024-12-13 DIAGNOSIS — Z90.710 ACQUIRED ABSENCE OF BOTH CERVIX AND UTERUS: Chronic | ICD-10-CM

## 2024-12-13 DIAGNOSIS — K21.00 GASTRO-ESOPHAGEAL REFLUX DISEASE WITH ESOPHAGITIS, WITHOUT BLEEDING: ICD-10-CM

## 2024-12-13 DIAGNOSIS — R63.4 ABNORMAL WEIGHT LOSS: ICD-10-CM

## 2024-12-13 DIAGNOSIS — Z98.890 OTHER SPECIFIED POSTPROCEDURAL STATES: Chronic | ICD-10-CM

## 2024-12-13 PROCEDURE — 43239 EGD BIOPSY SINGLE/MULTIPLE: CPT | Mod: GC

## 2024-12-13 PROCEDURE — 45378 DIAGNOSTIC COLONOSCOPY: CPT | Mod: GC

## 2024-12-13 PROCEDURE — 88305 TISSUE EXAM BY PATHOLOGIST: CPT

## 2024-12-13 PROCEDURE — 43239 EGD BIOPSY SINGLE/MULTIPLE: CPT

## 2024-12-13 PROCEDURE — 45378 DIAGNOSTIC COLONOSCOPY: CPT

## 2024-12-13 PROCEDURE — 88305 TISSUE EXAM BY PATHOLOGIST: CPT | Mod: 26

## 2024-12-13 DEVICE — NET RETRV ROT ROTH 2.5MMX230CM: Type: IMPLANTABLE DEVICE | Status: FUNCTIONAL

## 2024-12-13 RX ORDER — FAMOTIDINE 20 MG/1
20 TABLET, FILM COATED ORAL TWICE DAILY
Qty: 180 | Refills: 1 | Status: ACTIVE | COMMUNITY
Start: 2024-12-13 | End: 1900-01-01

## 2024-12-13 RX ORDER — FAMOTIDINE 20 MG/1
20 TABLET, FILM COATED ORAL TWICE DAILY
Qty: 60 | Refills: 6 | Status: ACTIVE | COMMUNITY
Start: 2024-12-13 | End: 1900-01-01

## 2024-12-13 RX ORDER — SODIUM CHLORIDE 9 MG/ML
500 INJECTION, SOLUTION INTRAMUSCULAR; INTRAVENOUS; SUBCUTANEOUS
Refills: 0 | Status: COMPLETED | OUTPATIENT
Start: 2024-12-13 | End: 2024-12-13

## 2024-12-13 RX ORDER — ONDANSETRON HYDROCHLORIDE 4 MG/1
4 TABLET, FILM COATED ORAL ONCE
Refills: 0 | Status: ACTIVE | OUTPATIENT
Start: 2024-12-13 | End: 2025-11-11

## 2024-12-13 RX ADMIN — SODIUM CHLORIDE 30 MILLILITER(S): 9 INJECTION, SOLUTION INTRAMUSCULAR; INTRAVENOUS; SUBCUTANEOUS at 07:57

## 2024-12-13 NOTE — ASU PREOP CHECKLIST - SITE MARKED BY ANESTHESIOLOGIST
From: Malia DOWLING  To: Negar Cordova  Sent: 2/11/2021 1:21 PM CST  Subject: colon prep cost    Hi Negar,   I am writing to inform you that your colon prep cost $67.50. This is with a coupon also. This is the prep less in volume. Is this okay? I can ask for an alternative?    Malia LOAIZA    n/a

## 2024-12-13 NOTE — ASU DISCHARGE PLAN (ADULT/PEDIATRIC) - FINANCIAL ASSISTANCE
[facility] provides services at a reduced cost to those who are determined to be eligible through [facility]’s financial assistance program. Information regarding [facility]’s financial assistance program can be found by going to [email or web address] or by calling [phone].

## 2024-12-13 NOTE — PRE PROCEDURE NOTE - PRE PROCEDURE EVALUATION
Attending Physician:             Katarzyna Delacruz               Procedure: upper endoscopy, colonoscopy    Indication for Procedure: unexplained weight loss  ________________________________________________________  PAST MEDICAL & SURGICAL HISTORY:  Glaucoma      HTN (hypertension)      HLD (hyperlipidemia)      Hypothyroidism      H/O abdominal hysterectomy      H/O laser iridotomy        ALLERGIES:  No Known Allergies    HOME MEDICATIONS:  acetaZOLAMIDE 125 mg oral tablet: 1 tab(s) orally 3 times a day  Aspir 81 oral delayed release tablet: 1 tab(s) orally once a day  brimonidine 0.2% ophthalmic solution: 1 drop(s) in each affected eye 2 times a day  Centrum Silver oral tablet: 1 tab(s) orally once a day  dorzolamide 2% ophthalmic solution: 1 drop(s) to each affected eye 2 times a day  hydroCHLOROthiazide 12.5 mg oral tablet: 1 tab(s) orally once a day  latanoprost 0.005% ophthalmic solution: 1 drop(s) to each affected eye once a day (at bedtime)  simvastatin 10 mg oral tablet: 1 tab(s) orally once a day (at bedtime)  timolol maleate 0.5% ophthalmic solution: 1 drop(s) to each affected eye 2 times a day    AICD/PPM: [ ] yes   [ ] no    PERTINENT LAB DATA:                      PHYSICAL EXAMINATION:    Height (cm): 160  Weight (kg): 55.3  BMI (kg/m2): 21.6  BSA (m2): 1.57T(C): --  HR: --  BP: --  RR: --  SpO2: --    Constitutional: NAD  HEENT: PERRLA, EOMI,    Neck:  No JVD  Respiratory: CTAB/L  Cardiovascular: S1 and S2  Gastrointestinal: BS+, soft, NT/ND  Extremities: No peripheral edema  Neurological: A/O x 3, no focal deficits  Psychiatric: Normal mood, normal affect  Skin: No rashes    ASA Class: I [ ]  II [ ]  III [ ]  IV [ ]    COMMENTS:    The patient is a suitable candidate for the planned procedure unless box checked [ ]  No, explain:

## 2024-12-13 NOTE — PRE-ANESTHESIA EVALUATION ADULT - NSRADCARDRESULTSFT_GEN_ALL_CORE
< from: TTDINAH W or WO Ultrasound Enhancing Agent (11.03.24 @ 12:37) >    _______________________________________________________________________________________     CONCLUSIONS:      1. Left ventricular cavityis normal in size. Left ventricular wall thickness is normal. Left ventricular systolic function is normal with an ejection fraction of 61 % by 3D. There are no regional wall motion abnormalities seen.   2. There is mild (grade 1) left ventricular diastolic dysfunction.   3. Normal right ventricular cavity size and normal right ventricular systolic function.   4. Structurally normal mitral valve with normal leaflet excursion. There is calcification of the mitral valve annulus. There is trace mitral regurgitation.    < end of copied text >

## 2024-12-13 NOTE — ASU PREOP CHECKLIST - TEMPERATURE IN CELSIUS (DEGREES C)
[Normal] : uterus [Cystocele] : a cystocele [Rectocele] : a rectocele [No Bleeding] : there was no active vaginal bleeding [Uterine Adnexae] : were not tender and not enlarged 36.8

## 2024-12-17 LAB — SURGICAL PATHOLOGY STUDY: SIGNIFICANT CHANGE UP

## 2024-12-23 ENCOUNTER — NON-APPOINTMENT (OUTPATIENT)
Age: 69
End: 2024-12-23

## 2025-01-14 ENCOUNTER — APPOINTMENT (OUTPATIENT)
Dept: OPHTHALMOLOGY | Facility: CLINIC | Age: 70
End: 2025-01-14
Payer: MEDICARE

## 2025-01-14 ENCOUNTER — NON-APPOINTMENT (OUTPATIENT)
Age: 70
End: 2025-01-14

## 2025-01-14 PROCEDURE — 92004 COMPRE OPH EXAM NEW PT 1/>: CPT

## 2025-01-14 PROCEDURE — 92133 CPTRZD OPH DX IMG PST SGM ON: CPT

## 2025-01-14 PROCEDURE — 76514 ECHO EXAM OF EYE THICKNESS: CPT

## 2025-01-17 ENCOUNTER — APPOINTMENT (OUTPATIENT)
Dept: OPHTHALMOLOGY | Facility: CLINIC | Age: 70
End: 2025-01-17

## 2025-01-17 PROCEDURE — ZZZZZ: CPT

## 2025-01-22 ENCOUNTER — APPOINTMENT (OUTPATIENT)
Dept: OPHTHALMOLOGY | Facility: CLINIC | Age: 70
End: 2025-01-22
Payer: MEDICARE

## 2025-01-22 ENCOUNTER — NON-APPOINTMENT (OUTPATIENT)
Age: 70
End: 2025-01-22

## 2025-01-22 PROCEDURE — 92020 GONIOSCOPY: CPT

## 2025-01-22 PROCEDURE — 92012 INTRM OPH EXAM EST PATIENT: CPT

## 2025-01-22 PROCEDURE — 92083 EXTENDED VISUAL FIELD XM: CPT

## 2025-01-31 ENCOUNTER — APPOINTMENT (OUTPATIENT)
Dept: MRI IMAGING | Facility: CLINIC | Age: 70
End: 2025-01-31
Payer: MEDICARE

## 2025-01-31 ENCOUNTER — OUTPATIENT (OUTPATIENT)
Dept: OUTPATIENT SERVICES | Facility: HOSPITAL | Age: 70
LOS: 1 days | End: 2025-01-31
Payer: MEDICARE

## 2025-01-31 DIAGNOSIS — Z98.890 OTHER SPECIFIED POSTPROCEDURAL STATES: Chronic | ICD-10-CM

## 2025-01-31 DIAGNOSIS — R93.2 ABNORMAL FINDINGS ON DIAGNOSTIC IMAGING OF LIVER AND BILIARY TRACT: ICD-10-CM

## 2025-01-31 DIAGNOSIS — Z90.710 ACQUIRED ABSENCE OF BOTH CERVIX AND UTERUS: Chronic | ICD-10-CM

## 2025-01-31 PROCEDURE — 74183 MRI ABD W/O CNTR FLWD CNTR: CPT | Mod: 26

## 2025-01-31 PROCEDURE — A9585: CPT

## 2025-01-31 PROCEDURE — 74183 MRI ABD W/O CNTR FLWD CNTR: CPT

## 2025-02-01 ENCOUNTER — LABORATORY RESULT (OUTPATIENT)
Age: 70
End: 2025-02-01

## 2025-02-03 LAB
ALBUMIN SERPL ELPH-MCNC: 4.3 G/DL
ALP BLD-CCNC: 80 U/L
ALT SERPL-CCNC: 40 U/L
ANION GAP SERPL CALC-SCNC: 12 MMOL/L
AST SERPL-CCNC: 31 U/L
BASOPHILS # BLD AUTO: 0.05 K/UL
BASOPHILS NFR BLD AUTO: 1.4 %
BILIRUB SERPL-MCNC: 0.7 MG/DL
BUN SERPL-MCNC: 14 MG/DL
CALCIUM SERPL-MCNC: 9.4 MG/DL
CHLORIDE SERPL-SCNC: 107 MMOL/L
CHOLEST SERPL-MCNC: 150 MG/DL
CO2 SERPL-SCNC: 26 MMOL/L
CREAT SERPL-MCNC: 0.88 MG/DL
EGFR: 70 ML/MIN/1.73M2
EOSINOPHIL # BLD AUTO: 0.17 K/UL
EOSINOPHIL NFR BLD AUTO: 4.7 %
ESTIMATED AVERAGE GLUCOSE: 131 MG/DL
GLUCOSE SERPL-MCNC: 100 MG/DL
HBA1C MFR BLD HPLC: 6.2 %
HCT VFR BLD CALC: 40.4 %
HDLC SERPL-MCNC: 56 MG/DL
HGB BLD-MCNC: 12.8 G/DL
IMM GRANULOCYTES NFR BLD AUTO: 0.3 %
LDLC SERPL CALC-MCNC: 83 MG/DL
LYMPHOCYTES # BLD AUTO: 1.22 K/UL
LYMPHOCYTES NFR BLD AUTO: 33.4 %
MAN DIFF?: NORMAL
MCHC RBC-ENTMCNC: 30.8 PG
MCHC RBC-ENTMCNC: 31.7 G/DL
MCV RBC AUTO: 97.3 FL
MONOCYTES # BLD AUTO: 0.38 K/UL
MONOCYTES NFR BLD AUTO: 10.4 %
NEUTROPHILS # BLD AUTO: 1.82 K/UL
NEUTROPHILS NFR BLD AUTO: 49.8 %
NONHDLC SERPL-MCNC: 94 MG/DL
PLATELET # BLD AUTO: 281 K/UL
POTASSIUM SERPL-SCNC: 3.6 MMOL/L
PROT SERPL-MCNC: 7 G/DL
RBC # BLD: 4.15 M/UL
RBC # FLD: 14.4 %
SODIUM SERPL-SCNC: 145 MMOL/L
TRIGL SERPL-MCNC: 54 MG/DL
TSH SERPL-ACNC: 4.89 UIU/ML
WBC # FLD AUTO: 3.65 K/UL

## 2025-02-06 NOTE — ED ADULT NURSE REASSESSMENT NOTE - NS ED NURSE REASSESS COMMENT FT1
break coverage rn. received report from DEBORAH marroquin. pt A&Ox4, noted to be Sinus kelsey on cardiac monitor with HR of 48 satting 100% on room air and BP within normal limits. denies chest pain, SOB ,nausea, vomiting, headache ,dizziness, numbness/tingling ot hands/feet. pt aware awaiting bed assignment at this time. safety maintained. call bell within reach [Nl] : Neurological [Short Stature] : short stature was noted [Change in Activity] : no change in activity [Change in Vision] : no change in vision  [Diarrhea] : no diarrhea [Abdominal Pain] : no abdominal pain [Constipation] : no constipation [Sleep Disturbances] : ~T no sleep disturbances [Urinary Frequency] : no urinary frequency [Headache] : no headache [Cold Intolerance] : cold tolerant [FreeTextEntry2] : eyeglasses

## 2025-02-07 ENCOUNTER — APPOINTMENT (OUTPATIENT)
Dept: INTERNAL MEDICINE | Facility: CLINIC | Age: 70
End: 2025-02-07
Payer: MEDICARE

## 2025-02-07 ENCOUNTER — OUTPATIENT (OUTPATIENT)
Dept: OUTPATIENT SERVICES | Facility: HOSPITAL | Age: 70
LOS: 1 days | End: 2025-02-07
Payer: MEDICARE

## 2025-02-07 VITALS
BODY MASS INDEX: 24.84 KG/M2 | SYSTOLIC BLOOD PRESSURE: 110 MMHG | HEART RATE: 68 BPM | DIASTOLIC BLOOD PRESSURE: 68 MMHG | HEIGHT: 62 IN | OXYGEN SATURATION: 97 % | WEIGHT: 135 LBS

## 2025-02-07 DIAGNOSIS — E03.9 HYPOTHYROIDISM, UNSPECIFIED: ICD-10-CM

## 2025-02-07 DIAGNOSIS — E78.5 HYPERLIPIDEMIA, UNSPECIFIED: ICD-10-CM

## 2025-02-07 DIAGNOSIS — Z00.00 ENCOUNTER FOR GENERAL ADULT MEDICAL EXAMINATION W/OUT ABNORMAL FINDINGS: ICD-10-CM

## 2025-02-07 DIAGNOSIS — I10 ESSENTIAL (PRIMARY) HYPERTENSION: ICD-10-CM

## 2025-02-07 DIAGNOSIS — H40.9 UNSPECIFIED GLAUCOMA: ICD-10-CM

## 2025-02-07 DIAGNOSIS — Z98.890 OTHER SPECIFIED POSTPROCEDURAL STATES: Chronic | ICD-10-CM

## 2025-02-07 DIAGNOSIS — Z90.710 ACQUIRED ABSENCE OF BOTH CERVIX AND UTERUS: Chronic | ICD-10-CM

## 2025-02-07 PROCEDURE — G2211 COMPLEX E/M VISIT ADD ON: CPT

## 2025-02-07 PROCEDURE — 99214 OFFICE O/P EST MOD 30 MIN: CPT

## 2025-02-07 PROCEDURE — G0463: CPT

## 2025-02-07 NOTE — PHYSICAL EXAM
[No Acute Distress] : no acute distress [No Respiratory Distress] : no respiratory distress  [No Accessory Muscle Use] : no accessory muscle use [Normal] : affect was normal and insight and judgment were intact

## 2025-02-07 NOTE — HISTORY OF PRESENT ILLNESS
[FreeTextEntry1] : follow up on HTN and hypothyroidism [de-identified] : Ms. Hurt is a 68 y/o F with PMH of HTN, HLD, hypothyroidism, bradycardia, prediabetes, glaucoma (s/p laser iridectomy in past) is here for a follow up. In the interval, pt underwent colonoscopy which showed external and internal hemorrhoids, C scope to be repeated in 10 years. Mammo wnl. Pt reported that she is doing well, has no concerns and that she is leaving for [facility] on the 15th will be returning back end of April.  Pt denies chest pain/sob/palpitations/N/V/abdominal pain/sensory and motor weakness or any acute symptoms other than stated above

## 2025-02-07 NOTE — ASSESSMENT
[FreeTextEntry1] : Ms. Hurt is a 70 y/o F with PMH of HTN, HLD, hypothyroidism, bradycardia, prediabetes, glaucoma (s/p laser iritoectomy in past), hemangioma of the liver is here to follow up on chronic illness management and preventative care   # Primary HTN - C/ w/ HCTZ 12.5 mg daily - c/ w/ aspirin 81 mg daily, pt was told that it is not indicated and that she can stop using it, but pt stated that she wants to take it. risks discussed  # Hyperlipidemia - c/ w/ simvastatin 10 mg daily  # Hypothyroidism: TSH noted to be 4.89, pt is asymptomatic  Plan is to c/ w/ the same dose 75 mcg as pt does not want to go back and forth with the doses, will follow up a repeat TSH once pt is back from [facility]  Pt stated that initially was on 88 mcg levothyroxine, now decreased to 75 mcg during hospitalization Endo follow up   # Bilateral eye glaucoma - Saw an ophthalmologist in [facility] - c/ w/ brimonidine - c/ w/ acetazolamide - c/ w/ latanoprost - c/ w/ dorzolamide - c/ w/ timolol  # Benign liver lesion  # 01/31/2025, MRI showed likely Hemangioma determinate hepatic lesions favored to be benign as above and without change from prior imaging on 11/6/2024. Consider follow-up in 6-12 months. follow up MRI  Strict RTC discussed with the patient if there's worsening symptoms or feeling unwell in general   RTC for a PCP visit in 3 mos or as needed

## 2025-02-13 ENCOUNTER — APPOINTMENT (OUTPATIENT)
Dept: INTERNAL MEDICINE | Facility: CLINIC | Age: 70
End: 2025-02-13

## 2025-02-18 DIAGNOSIS — E03.9 HYPOTHYROIDISM, UNSPECIFIED: ICD-10-CM

## 2025-02-18 DIAGNOSIS — H40.9 UNSPECIFIED GLAUCOMA: ICD-10-CM

## 2025-02-18 DIAGNOSIS — E78.5 HYPERLIPIDEMIA, UNSPECIFIED: ICD-10-CM

## 2025-03-04 ENCOUNTER — APPOINTMENT (OUTPATIENT)
Dept: INTERNAL MEDICINE | Facility: CLINIC | Age: 70
End: 2025-03-04
Payer: MEDICARE

## 2025-03-04 ENCOUNTER — OUTPATIENT (OUTPATIENT)
Dept: OUTPATIENT SERVICES | Facility: HOSPITAL | Age: 70
LOS: 1 days | End: 2025-03-04
Payer: MEDICARE

## 2025-03-04 VITALS
WEIGHT: 134 LBS | OXYGEN SATURATION: 98 % | BODY MASS INDEX: 24.51 KG/M2 | HEART RATE: 73 BPM | DIASTOLIC BLOOD PRESSURE: 72 MMHG | SYSTOLIC BLOOD PRESSURE: 136 MMHG

## 2025-03-04 DIAGNOSIS — Z90.710 ACQUIRED ABSENCE OF BOTH CERVIX AND UTERUS: Chronic | ICD-10-CM

## 2025-03-04 DIAGNOSIS — S09.93XD UNSPECIFIED INJURY OF FACE, SUBSEQUENT ENCOUNTER: ICD-10-CM

## 2025-03-04 DIAGNOSIS — I10 ESSENTIAL (PRIMARY) HYPERTENSION: ICD-10-CM

## 2025-03-04 DIAGNOSIS — Z98.890 OTHER SPECIFIED POSTPROCEDURAL STATES: Chronic | ICD-10-CM

## 2025-03-04 PROCEDURE — 15853 REMOVAL SUTR/STAPL XREQ ANES: CPT

## 2025-03-04 PROCEDURE — 99214 OFFICE O/P EST MOD 30 MIN: CPT

## 2025-03-04 PROCEDURE — G2211 COMPLEX E/M VISIT ADD ON: CPT

## 2025-03-04 PROCEDURE — G0463: CPT

## 2025-03-04 NOTE — END OF VISIT
[FreeTextEntry2] :  45 minutes of total time was spent on the date of the encounter. This included time for review of medical records and laboratory results, face to face time (including the physical examination counseling and coordination of care), time for patient education, treatment plans, answering questions, communicating with other doctors and for medical record documentation.  The time spent is separate from time spent on separately billed procedures.

## 2025-03-04 NOTE — PHYSICAL EXAM
[No Acute Distress] : no acute distress [Well Nourished] : well nourished [Well Developed] : well developed [Well-Appearing] : well-appearing [Normal Sclera/Conjunctiva] : normal sclera/conjunctiva [PERRL] : pupils equal round and reactive to light [EOMI] : extraocular movements intact [Normal Outer Ear/Nose] : the outer ears and nose were normal in appearance [Normal Oropharynx] : the oropharynx was normal [No JVD] : no jugular venous distention [No Lymphadenopathy] : no lymphadenopathy [Supple] : supple [Thyroid Normal, No Nodules] : the thyroid was normal and there were no nodules present [No Respiratory Distress] : no respiratory distress  [No Accessory Muscle Use] : no accessory muscle use [Clear to Auscultation] : lungs were clear to auscultation bilaterally [Normal Rate] : normal rate  [Regular Rhythm] : with a regular rhythm [Normal S1, S2] : normal S1 and S2 [No Murmur] : no murmur heard [No Carotid Bruits] : no carotid bruits [No Abdominal Bruit] : a ~M bruit was not heard ~T in the abdomen [No Varicosities] : no varicosities [Pedal Pulses Present] : the pedal pulses are present [No Edema] : there was no peripheral edema [No Palpable Aorta] : no palpable aorta [No Extremity Clubbing/Cyanosis] : no extremity clubbing/cyanosis [Soft] : abdomen soft [Non Tender] : non-tender [Non-distended] : non-distended [No Masses] : no abdominal mass palpated [No HSM] : no HSM [Normal Bowel Sounds] : normal bowel sounds [Normal Posterior Cervical Nodes] : no posterior cervical lymphadenopathy [Normal Anterior Cervical Nodes] : no anterior cervical lymphadenopathy [No CVA Tenderness] : no CVA  tenderness [No Spinal Tenderness] : no spinal tenderness [No Joint Swelling] : no joint swelling [Grossly Normal Strength/Tone] : grossly normal strength/tone [No Rash] : no rash [Coordination Grossly Intact] : coordination grossly intact [No Focal Deficits] : no focal deficits [Normal Gait] : normal gait [Deep Tendon Reflexes (DTR)] : deep tendon reflexes were 2+ and symmetric [Normal Affect] : the affect was normal [Normal Insight/Judgement] : insight and judgment were intact [de-identified] : forehead abrasion  [de-identified] : upper lip abrasions w/ upper lip edema, sutures in place. normal oral mucosa

## 2025-03-04 NOTE — ASSESSMENT
[FreeTextEntry1] : celena Hurt is a 68 y/o F with PMH of HTN, HLD, hypothyroidism, bradycardia, prediabetes, glaucoma (s/p laser iridectomy in past), hemangioma of the liver is here today for suture removal of the upper lip s/p facial trauma.  # Facial trauma  s/p suture removal, no acute events  no role for antibiotics  follow up with dentist  Strict RTC discussed with the patient if there's worsening symptoms like fever/chills/pus drainage or feeling unwell in general    RTC for a PCP visit in 3 mos or as needed

## 2025-03-05 ENCOUNTER — APPOINTMENT (OUTPATIENT)
Dept: OPHTHALMOLOGY | Facility: CLINIC | Age: 70
End: 2025-03-05
Payer: MEDICARE

## 2025-03-05 ENCOUNTER — NON-APPOINTMENT (OUTPATIENT)
Age: 70
End: 2025-03-05

## 2025-03-05 PROCEDURE — 92014 COMPRE OPH EXAM EST PT 1/>: CPT

## 2025-03-10 DIAGNOSIS — S09.93XD UNSPECIFIED INJURY OF FACE, SUBSEQUENT ENCOUNTER: ICD-10-CM

## 2025-03-13 ENCOUNTER — EMERGENCY (EMERGENCY)
Facility: HOSPITAL | Age: 70
LOS: 1 days | Discharge: HOME | End: 2025-03-13
Attending: STUDENT IN AN ORGANIZED HEALTH CARE EDUCATION/TRAINING PROGRAM | Admitting: STUDENT IN AN ORGANIZED HEALTH CARE EDUCATION/TRAINING PROGRAM
Payer: MEDICARE

## 2025-03-13 VITALS
TEMPERATURE: 99 F | RESPIRATION RATE: 16 BRPM | WEIGHT: 134.04 LBS | SYSTOLIC BLOOD PRESSURE: 137 MMHG | HEART RATE: 66 BPM | OXYGEN SATURATION: 99 % | DIASTOLIC BLOOD PRESSURE: 79 MMHG

## 2025-03-13 DIAGNOSIS — Z98.890 OTHER SPECIFIED POSTPROCEDURAL STATES: Chronic | ICD-10-CM

## 2025-03-13 DIAGNOSIS — Z90.710 ACQUIRED ABSENCE OF BOTH CERVIX AND UTERUS: Chronic | ICD-10-CM

## 2025-03-13 PROCEDURE — 99284 EMERGENCY DEPT VISIT MOD MDM: CPT

## 2025-03-13 NOTE — ED ADULT TRIAGE NOTE - CHIEF COMPLAINT QUOTE
ambulatory, s/p fall X 2 weeks ago c/o pain to the mouth and teeth. reports saw PCP last week who took out sutures from lip last week, still endorsing pain from fall. Phx HTN, HLD, glaucoma

## 2025-03-13 NOTE — ED PROVIDER NOTE - PATIENT PORTAL LINK FT
You can access the FollowMyHealth Patient Portal offered by [facility] by registering at the following website: [email or web address]. By joining [facility]’s FollowMyHealth portal, you will also be able to view your health information using other applications (apps) compatible with our system.

## 2025-03-13 NOTE — ED PROVIDER NOTE - NSFOLLOWUPINSTRUCTIONS_ED_ALL_ED_FT
You were seen today for loose tooth.  You will receive a call to make an appointment.  You may also call at the listed number and inform them that you were seen at the emergency department.    Please avoid eating anything that is hot/cold/spicy and avoid biting into the food with front teeth.    If your symptoms worsen and/or you start developing fevers, vomiting, unable to eat or drink or any other concerning symptoms please seek medical assistance immediately.

## 2025-03-13 NOTE — ED PROVIDER NOTE - CLINICAL SUMMARY MEDICAL DECISION MAKING FREE TEXT BOX
69-year-old female with past medical history of hypertension, hyperlipidemia, hypothyroidism coming in with swelling of the upper lip and loose teeth.  As per the daughter at the bedside patient took a fall about 2 weeks ago.  Patient had a small laceration which was repaired and sutures were removed by the PCP.  No fevers, chills, difficulty swallowing.  Patient has pain with biting into things because one of the front teeth are moving.  Also chipped a tooth.    Patient is nontoxic-appearing.  No distress.  Mild swelling of the upper lip.  Talking in full sentences.  No gum swelling noted.  Tooth #9 is slightly mobile.  Tooth #8 is chipped at the front but still intact.    Differential diagnoses include but not limited to due to tooth trauma secondary to fall.  At this time patient is not an aspiration risk.    Spoke with dental resident over the phone–Dr. Kiel Wang–patient's information including phone number is sent to him via teams message.  States he will provide information to the dental clinic who will call the patient to schedule an appointment as soon as possible.  He recommended to tell the patient to avoid drinking or eating hot hot/cold/spicy and avoid biting into food with front teeth.    Strict return precautions are discussed with the patient.  All questions were answered.

## 2025-03-13 NOTE — ED PROVIDER NOTE - NSFOLLOWUPCLINICS_GEN_ALL_ED_FT
Oral & Maxillofacial Surgery  Department of Dental Medicine  [phone] [place] [address]  Phone: [phone]  Fax: [phone]

## 2025-03-21 ENCOUNTER — APPOINTMENT (OUTPATIENT)
Age: 70
End: 2025-03-21
Payer: SELF-PAY

## 2025-03-21 PROCEDURE — D0220: CPT

## 2025-03-21 PROCEDURE — D0140: CPT

## 2025-03-21 PROCEDURE — D0230: CPT

## 2025-03-27 ENCOUNTER — APPOINTMENT (OUTPATIENT)
Dept: OPHTHALMOLOGY | Facility: CLINIC | Age: 70
End: 2025-03-27

## 2025-03-28 ENCOUNTER — APPOINTMENT (OUTPATIENT)
Age: 70
End: 2025-03-28
Payer: SELF-PAY

## 2025-03-28 PROCEDURE — D0171: CPT

## 2025-04-04 ENCOUNTER — APPOINTMENT (OUTPATIENT)
Age: 70
End: 2025-04-04
Payer: SELF-PAY

## 2025-04-04 PROCEDURE — D0171: CPT

## 2025-04-29 ENCOUNTER — APPOINTMENT (OUTPATIENT)
Dept: INTERNAL MEDICINE | Facility: CLINIC | Age: 70
End: 2025-04-29

## 2025-05-05 ENCOUNTER — NON-APPOINTMENT (OUTPATIENT)
Age: 70
End: 2025-05-05

## 2025-05-05 ENCOUNTER — APPOINTMENT (OUTPATIENT)
Dept: GASTROENTEROLOGY | Facility: CLINIC | Age: 70
End: 2025-05-05
Payer: MEDICARE

## 2025-05-05 VITALS
DIASTOLIC BLOOD PRESSURE: 75 MMHG | WEIGHT: 138.6 LBS | HEART RATE: 65 BPM | OXYGEN SATURATION: 98 % | SYSTOLIC BLOOD PRESSURE: 130 MMHG | BODY MASS INDEX: 25.35 KG/M2

## 2025-05-05 DIAGNOSIS — R93.2 ABNORMAL FINDINGS ON DIAGNOSTIC IMAGING OF LIVER AND BILIARY TRACT: ICD-10-CM

## 2025-05-05 DIAGNOSIS — K21.00 GASTRO-ESOPHAGEAL REFLUX DISEASE WITH ESOPHAGITIS, WITHOUT BLEEDING: ICD-10-CM

## 2025-05-05 PROCEDURE — 99214 OFFICE O/P EST MOD 30 MIN: CPT

## 2025-05-05 NOTE — PHYSICAL EXAM
[Alert] : alert [Normal Voice/Communication] : normal voice/communication [Healthy Appearing] : healthy appearing [No Acute Distress] : no acute distress [Sclera] : the sclera and conjunctiva were normal [Hearing Threshold Finger Rub Not [place]] : hearing was normal [Normal Appearance] : the appearance of the neck was normal [No Respiratory Distress] : no respiratory distress [No Acc Muscle Use] : no accessory muscle use [Respiration, Rhythm And Depth] : normal respiratory rhythm and effort [Auscultation Breath Sounds / Voice Sounds] : lungs were clear to auscultation bilaterally [Normal S1, S2] : normal S1 and S2 [Heart Rate And Rhythm] : heart rate was normal and rhythm regular [Bowel Sounds] : normal bowel sounds [Abdomen Tenderness] : non-tender [No Masses] : no abdominal mass palpated [Abdomen Soft] : soft [No CVA Tenderness] : no CVA  tenderness [Abnormal Walk] : normal gait [Normal Color / Pigmentation] : normal skin color and pigmentation [No Focal Deficits] : no focal deficits [Oriented To Time, Place, And Person] : oriented to person, place, and time

## 2025-05-05 NOTE — REVIEW OF SYSTEMS
[Fever] : no fever [Chills] : no chills [Recent Weight Gain (___ Lbs)] : recent [unfilled] ~Ulb weight gain [As Noted in HPI] : as noted in HPI [Negative] : Heme/Lymph

## 2025-05-05 NOTE — HISTORY OF PRESENT ILLNESS
[de-identified] : CT C/A/P 11/2/24: FINDINGS: CHEST: LUNGS AND LARGE AIRWAYS: Patent central airways. No pulmonary nodules. PLEURA: No pleural effusion. VESSELS: Within normal limits. HEART: Heart size is normal. No pericardial effusion. MEDIASTINUM AND CLIFTON: No lymphadenopathy. CHEST WALL AND LOWER NECK: Within normal limits.  ABDOMEN AND PELVIS: LIVER: Indeterminate lesion in segment 7 measuring 1.3 x 1.0 cm.  Subcentimeter lesions too small to characterize. BILE DUCTS: Normal caliber. GALLBLADDER: Within normal limits. SPLEEN: Within normal limits. PANCREAS: Within normal limits. ADRENALS: Within normal limits. KIDNEYS/URETERS: Within normal limits.  BLADDER: Within normal limits. REPRODUCTIVE ORGANS: Uterus and bilateral adnexa within normal limits.  BOWEL: No bowel obstruction. Appendix is normal. PERITONEUM/RETROPERITONEUM: Within normal limits. VESSELS: Atherosclerotic calcifications. LYMPH NODES: No lymphadenopathy. ABDOMINAL WALL: Within normal limits. BONES: Within normal limits.  IMPRESSION: Indeterminate lesion in the right hepatic lobe measuring up to 1.3 cm.  Lesion may be further evaluated with contrast-enhanced abdominal MRI.  Otherwise, no evidence of suspicious mass or adenopathy in the chest,  abdomen, or pelvis.  [FreeTextEntry1] : 11/6/24 FINDINGS: LOWER CHEST: Within normal limits.  LIVER: Normal morphology. No steatosis. A 1.3 cm T2 hyperintense lesion  in segment 7 with nodular focus of enhancement and delayed fill-in, most  likely represents a hemangioma. Additional 6 mm T2 isointense lesion in  segment 8 with delayed enhancement. No restricted diffusion.  Subcentimeter right hepatic lobe cyst. BILE DUCTS: Normal caliber. GALLBLADDER: Within normal limits. SPLEEN: Within normal limits. PANCREAS: Within normal limits. ADRENALS: Within normal limits. KIDNEYS/URETERS: Within normal limits.  VISUALIZED PORTIONS: BOWEL: Within normal limits. PERITONEUM: No ascites. VESSELS: Within normal limits. RETROPERITONEUM/LYMPH NODES: No lymphadenopathy. ABDOMINAL WALL: Within normal limits. BONES: Within normal limits.  IMPRESSION: A 1.3 cm right hepatic lobe lesion, most likely represents a hemangioma.  Additional indeterminant subcentimeter right hepatic lobe lesion,  possibly atypical hemangioma. Recommend follow-up MRI in 3 months to  evaluate for stability.

## 2025-05-05 NOTE — CONSULT LETTER
[Dear  ___] : Dear  [unfilled], [Consult Letter:] : I had the pleasure of evaluating your patient, [unfilled]. [Please see my note below.] : Please see my note below. [Consult Closing:] : Thank you very much for allowing me to participate in the care of this patient.  If you have any questions, please do not hesitate to contact me. [Sincerely,] : Sincerely, [FreeTextEntry2] : Dr. Miriam Headley [FreeTextEntry3] : Noim Daniel MD [facility] Gastroenterology  of Medicine, [facility] at \[facility] [place] [facility]\""/[facility] Tel: [phone] Fax: [phone]

## 2025-05-05 NOTE — END OF VISIT
[Time Spent: ___ minutes] : I have spent [unfilled] minutes of time on the encounter which excludes teaching and separately reported services. PAST SURGICAL HISTORY:  History of ileostomy

## 2025-05-05 NOTE — REASON FOR VISIT
[Follow-up] : a follow-up of an existing diagnosis [Family Member] : family member [FreeTextEntry1] : Grade A esophagitis

## 2025-05-05 NOTE — ASSESSMENT
[FreeTextEntry1] : Impression: 1. Grade A esophagitis - currently asymptomatic 2. Abnormal hepatic lobe lesion - likely hemangioma/atypical hemangioma  Plan: -Discussed that since she has only mild grade A esophagitis and has no symptoms, does not necessitate long term acid suppression -Advised lifestyle modifications for reflux. Asked patient to avoid eating 3 hours before going to bed or laying down, and to elevate the head of his bed. -Advised Pepcid PRN -Reviewed MRI finding, advised patient to consider return in end of the year to repeat MR, to ensure the other possible atypical hemangioma seen on MR which is indeterminate does not change -Otherwise patient is up to date with colon cancer screening; explained that at age >75, she should individualize risks vs benefits of screening and consider stopping routine colonoscopies -Otherwise weight loss has resolved; monitor for now

## 2025-05-06 ENCOUNTER — APPOINTMENT (OUTPATIENT)
Dept: INTERNAL MEDICINE | Facility: CLINIC | Age: 70
End: 2025-05-06
Payer: MEDICARE

## 2025-05-06 VITALS
DIASTOLIC BLOOD PRESSURE: 93 MMHG | HEART RATE: 62 BPM | BODY MASS INDEX: 25.4 KG/M2 | TEMPERATURE: 98 F | SYSTOLIC BLOOD PRESSURE: 156 MMHG | OXYGEN SATURATION: 95 % | WEIGHT: 138 LBS | HEIGHT: 62 IN

## 2025-05-06 VITALS — SYSTOLIC BLOOD PRESSURE: 130 MMHG | DIASTOLIC BLOOD PRESSURE: 78 MMHG

## 2025-05-06 DIAGNOSIS — E03.9 HYPOTHYROIDISM, UNSPECIFIED: ICD-10-CM

## 2025-05-06 DIAGNOSIS — I10 ESSENTIAL (PRIMARY) HYPERTENSION: ICD-10-CM

## 2025-05-06 DIAGNOSIS — E78.5 HYPERLIPIDEMIA, UNSPECIFIED: ICD-10-CM

## 2025-05-06 DIAGNOSIS — S09.93XD UNSPECIFIED INJURY OF FACE, SUBSEQUENT ENCOUNTER: ICD-10-CM

## 2025-05-06 DIAGNOSIS — R92.0 MAMMOGRAPHIC MICROCALCIFICATION FOUND ON DIAGNOSTIC IMAGING OF BREAST: ICD-10-CM

## 2025-05-06 PROCEDURE — G2211 COMPLEX E/M VISIT ADD ON: CPT

## 2025-05-06 PROCEDURE — 99213 OFFICE O/P EST LOW 20 MIN: CPT

## 2025-05-07 NOTE — ED PROVIDER NOTE - NSICDXPASTSURGICALHX_GEN_ALL_CORE_FT
[facility]  [address] [place] [address]  Phone [phone]  Fax:  [phone]    Patient: Bernard Woody  YOB: 1949  Patient Age 76 y.o.  Patient sex: male  Medical Record:  [id number]  Visit Date: 05/07/25    [facility] Note     Chief Complaint   Patient presents with    Medicare AWV     6 month, feeling fine       History of Present Illness:  History of Present Illness  The patient presents for a 6-month follow-up.    He has been under the care of Dr. Blankenship, an ENT specialist, who reported satisfactory progress. The last ENT visit was in 04/2024. [facility] but has a scheduled appointment in 08/2025. He noted that the [facility] not [facility] for his upcoming appointment. His last lab work was conducted in 09/2024, which included an A1c and B12 test, both of which were within normal limits. He has been consuming iron-rich foods but has not been supplementing with iron. His diet includes a high intake of eggs and banana pudding. He maintains an active lifestyle, walking approximately one mile daily. He reports no chest pain, cough, wheezing, or shortness of breath. He has discontinued smoking. He does not experience leg pain during his walks. He typically monitors his blood pressure at home.     He has requested a PSA test and expressed interest in having his thyroid levels checked. He has a 90-day supply of levothyroxine remaining and a 90-day supply of Celebrex left, not requiring a refill at this time. He has been using compression stockings. His weight fluctuates between 166 and 170 pounds, with a noted decrease of 30 pounds over the past four years.    SOCIAL HISTORY  He quit smoking.  76-year-old male previously here in March for referral to vascular for varicose veins.  He is here for follow-up.    He has a history of varicose veins in his lower extremities, which were previously managed with injections and laser 
PAST SURGICAL HISTORY:  H/O abdominal hysterectomy

## 2025-05-09 NOTE — ASSESSMENT
[FreeTextEntry1] : 68 y/o F with PMH of HTN, HLD, hypothyroidism, prediabetes, glaucoma (sp laser iritoectomy in past) is coming in to establish care and follow up of chronic conditions.  #Insomnia -Discussed proper sleep hygiene   #HTN -Repeat manual BP acceptable -C/w HCTZ 12.5mg qd  #Hypothyroidism -Recent TSH acceptable given age -Will c/w Levothyroxine 75mcg qd -Recheck TSH in 3 months  #HLD -Recent lipid profile wnl -C/w Simvastatin 10mg qd  #Glaucoma -Patient to see Ophtho for renewal of glaucoma meds  Mammo ordered  RTC in 3 months

## 2025-05-09 NOTE — HISTORY OF PRESENT ILLNESS
[Family Member] : family member [FreeTextEntry1] : 70 y/o F with PMH of HTN, HLD, hypothyroidism, prediabetes, glaucoma (sp laser iritoectomy in past) is coming in to establish care and follow up of chronic conditions. [de-identified] : 68 y/o F with PMH of HTN, HLD, hypothyroidism, prediabetes, glaucoma (sp laser iritoectomy in past) is coming in to establish care and follow up of chronic conditions.  Trouble sleeping. Needs med refills   Recommended for repeat mammo back in December.   Doing well otherwise, no acute complaints. Has upcoming appt for Ophtho for management of her glaucoma

## 2025-05-09 NOTE — PHYSICAL EXAM
[No Respiratory Distress] : no respiratory distress  [No Accessory Muscle Use] : no accessory muscle use [Clear to Auscultation] : lungs were clear to auscultation bilaterally [Normal Rate] : normal rate  [Regular Rhythm] : with a regular rhythm [No Edema] : there was no peripheral edema [Soft] : abdomen soft [Non Tender] : non-tender [Non-distended] : non-distended [Normal] : affect was normal and insight and judgment were intact

## 2025-05-09 NOTE — HISTORY OF PRESENT ILLNESS
[Family Member] : family member [FreeTextEntry1] : 68 y/o F with PMH of HTN, HLD, hypothyroidism, prediabetes, glaucoma (sp laser iritoectomy in past) is coming in to establish care and follow up of chronic conditions. [de-identified] : 68 y/o F with PMH of HTN, HLD, hypothyroidism, prediabetes, glaucoma (sp laser iritoectomy in past) is coming in to establish care and follow up of chronic conditions.  Trouble sleeping. Needs med refills   Recommended for repeat mammo back in December.   Doing well otherwise, no acute complaints. Has upcoming appt for Ophtho for management of her glaucoma

## 2025-05-09 NOTE — ASSESSMENT
[FreeTextEntry1] : 70 y/o F with PMH of HTN, HLD, hypothyroidism, prediabetes, glaucoma (sp laser iritoectomy in past) is coming in to establish care and follow up of chronic conditions.  #Insomnia -Discussed proper sleep hygiene   #HTN -Repeat manual BP acceptable -C/w HCTZ 12.5mg qd  #Hypothyroidism -Recent TSH acceptable given age -Will c/w Levothyroxine 75mcg qd -Recheck TSH in 3 months  #HLD -Recent lipid profile wnl -C/w Simvastatin 10mg qd  #Glaucoma -Patient to see Ophtho for renewal of glaucoma meds  Mammo ordered  RTC in 3 months

## 2025-05-14 ENCOUNTER — APPOINTMENT (OUTPATIENT)
Dept: OPHTHALMOLOGY | Facility: CLINIC | Age: 70
End: 2025-05-14
Payer: MEDICARE

## 2025-05-14 ENCOUNTER — NON-APPOINTMENT (OUTPATIENT)
Age: 70
End: 2025-05-14

## 2025-05-14 PROCEDURE — 92012 INTRM OPH EXAM EST PATIENT: CPT

## 2025-06-03 ENCOUNTER — RESULT REVIEW (OUTPATIENT)
Age: 70
End: 2025-06-03

## 2025-06-03 ENCOUNTER — APPOINTMENT (OUTPATIENT)
Dept: MAMMOGRAPHY | Facility: CLINIC | Age: 70
End: 2025-06-03
Payer: MEDICARE

## 2025-06-03 ENCOUNTER — OUTPATIENT (OUTPATIENT)
Dept: OUTPATIENT SERVICES | Facility: HOSPITAL | Age: 70
LOS: 1 days | End: 2025-06-03
Payer: MEDICARE

## 2025-06-03 DIAGNOSIS — R92.0 MAMMOGRAPHIC MICROCALCIFICATION FOUND ON DIAGNOSTIC IMAGING OF BREAST: ICD-10-CM

## 2025-06-03 DIAGNOSIS — Z98.890 OTHER SPECIFIED POSTPROCEDURAL STATES: Chronic | ICD-10-CM

## 2025-06-03 DIAGNOSIS — Z00.00 ENCOUNTER FOR GENERAL ADULT MEDICAL EXAMINATION WITHOUT ABNORMAL FINDINGS: ICD-10-CM

## 2025-06-03 DIAGNOSIS — Z90.710 ACQUIRED ABSENCE OF BOTH CERVIX AND UTERUS: Chronic | ICD-10-CM

## 2025-06-03 PROCEDURE — G0279: CPT | Mod: 26

## 2025-06-03 PROCEDURE — 77065 DX MAMMO INCL CAD UNI: CPT

## 2025-06-03 PROCEDURE — 77065 DX MAMMO INCL CAD UNI: CPT | Mod: 26,RT

## 2025-06-03 PROCEDURE — G0279: CPT

## 2025-06-17 ENCOUNTER — APPOINTMENT (OUTPATIENT)
Dept: ULTRASOUND IMAGING | Facility: CLINIC | Age: 70
End: 2025-06-17
Payer: MEDICARE

## 2025-06-17 ENCOUNTER — OUTPATIENT (OUTPATIENT)
Dept: OUTPATIENT SERVICES | Facility: HOSPITAL | Age: 70
LOS: 1 days | End: 2025-06-17
Payer: MEDICARE

## 2025-06-17 ENCOUNTER — RESULT REVIEW (OUTPATIENT)
Age: 70
End: 2025-06-17

## 2025-06-17 DIAGNOSIS — Z98.890 OTHER SPECIFIED POSTPROCEDURAL STATES: Chronic | ICD-10-CM

## 2025-06-17 DIAGNOSIS — R92.0 MAMMOGRAPHIC MICROCALCIFICATION FOUND ON DIAGNOSTIC IMAGING OF BREAST: ICD-10-CM

## 2025-06-17 DIAGNOSIS — Z90.710 ACQUIRED ABSENCE OF BOTH CERVIX AND UTERUS: Chronic | ICD-10-CM

## 2025-06-17 PROCEDURE — 76642 ULTRASOUND BREAST LIMITED: CPT

## 2025-06-17 PROCEDURE — 76642 ULTRASOUND BREAST LIMITED: CPT | Mod: 26,RT

## 2025-06-18 ENCOUNTER — APPOINTMENT (OUTPATIENT)
Dept: INTERNAL MEDICINE | Facility: CLINIC | Age: 70
End: 2025-06-18
Payer: MEDICARE

## 2025-06-18 VITALS
HEART RATE: 65 BPM | TEMPERATURE: 98 F | SYSTOLIC BLOOD PRESSURE: 134 MMHG | HEIGHT: 62 IN | OXYGEN SATURATION: 97 % | WEIGHT: 138 LBS | BODY MASS INDEX: 25.4 KG/M2 | DIASTOLIC BLOOD PRESSURE: 87 MMHG
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
## 2025-06-18 PROCEDURE — G2211 COMPLEX E/M VISIT ADD ON: CPT

## 2025-06-18 PROCEDURE — 99214 OFFICE O/P EST MOD 30 MIN: CPT

## 2025-06-18 PROCEDURE — 36415 COLL VENOUS BLD VENIPUNCTURE: CPT

## 2025-06-18 PROCEDURE — 93000 ELECTROCARDIOGRAM COMPLETE: CPT

## 2025-06-18 NOTE — HISTORY OF PRESENT ILLNESS
[Family Member] : family member [FreeTextEntry8] : 69 y/o F with PMH of HTN, HLD, hypothyroidism, prediabetes, glaucoma (sp laser iritoectomy in past) is coming in for fall x 2.  Last weekend, fell twice. Did not hit her head or pass out. Denies any dizziness, lightheadedness. Seems to have missed her step. Has cataracts, has not had surgery yet because her Ophthalmologist is booked. At today's visit, she mistook the trash can as the patient chair.   Hit her knees, pain is currently 1/10, has been icing her knees.   Recent abnormal mammogram, need for US guided core Biopsy of R breast  Has upcoming appt w/ Cards.

## 2025-06-18 NOTE — PHYSICAL EXAM
[Normal Sclera/Conjunctiva] : normal sclera/conjunctiva [EOMI] : extraocular movements intact [No Respiratory Distress] : no respiratory distress  [No Accessory Muscle Use] : no accessory muscle use [Clear to Auscultation] : lungs were clear to auscultation bilaterally [Normal Rate] : normal rate  [Regular Rhythm] : with a regular rhythm [No Edema] : there was no peripheral edema [No Focal Deficits] : no focal deficits [Normal] : affect was normal and insight and judgment were intact [de-identified] : No TTP of knees, Flexion and extension of knees b/l does not elicit any pain.

## 2025-06-18 NOTE — PHYSICAL EXAM
[Normal Sclera/Conjunctiva] : normal sclera/conjunctiva [EOMI] : extraocular movements intact [No Respiratory Distress] : no respiratory distress  [No Accessory Muscle Use] : no accessory muscle use [Clear to Auscultation] : lungs were clear to auscultation bilaterally [Normal Rate] : normal rate  [Regular Rhythm] : with a regular rhythm [No Edema] : there was no peripheral edema [No Focal Deficits] : no focal deficits [Normal] : affect was normal and insight and judgment were intact [de-identified] : No TTP of knees, Flexion and extension of knees b/l does not elicit any pain.

## 2025-06-18 NOTE — ASSESSMENT
[FreeTextEntry1] : 69 y/o F with PMH of HTN, HLD, hypothyroidism, prediabetes, glaucoma (sp laser iritoectomy in past) is coming in for fall x 2.  #Fall -Seems mechanical, may be due to poor vision -Recommended patient follow up w/ her ophthalmologist, try to have cataract surgery done soon -Check CBC, CMP, B12, folate, TSH -EKG done - similar to prior. Upcoming Cards appt   #Abnormal mammogram -Order placed for Right US Core Biopsy of Breast  RTC in 6 months for CPE

## 2025-06-19 LAB
ALBUMIN SERPL ELPH-MCNC: 4.5 G/DL
ALP BLD-CCNC: 80 U/L
ALT SERPL-CCNC: 47 U/L
ANION GAP SERPL CALC-SCNC: 10 MMOL/L
AST SERPL-CCNC: 18 U/L
BILIRUB SERPL-MCNC: 0.5 MG/DL
BUN SERPL-MCNC: 18 MG/DL
CALCIUM SERPL-MCNC: 9.5 MG/DL
CHLORIDE SERPL-SCNC: 105 MMOL/L
CO2 SERPL-SCNC: 27 MMOL/L
CREAT SERPL-MCNC: 0.9 MG/DL
EGFRCR SERPLBLD CKD-EPI 2021: 68 ML/MIN/1.73M2
ESTIMATED AVERAGE GLUCOSE: 134 MG/DL
FOLATE SERPL-MCNC: >20 NG/ML
GLUCOSE SERPL-MCNC: 101 MG/DL
HBA1C MFR BLD HPLC: 6.3 %
HCT VFR BLD CALC: 38.5 %
HGB BLD-MCNC: 12.4 G/DL
MCHC RBC-ENTMCNC: 30.3 PG
MCHC RBC-ENTMCNC: 32.2 G/DL
MCV RBC AUTO: 94.1 FL
PLATELET # BLD AUTO: 203 K/UL
POTASSIUM SERPL-SCNC: 4.4 MMOL/L
PROT SERPL-MCNC: 7.1 G/DL
RBC # BLD: 4.09 M/UL
RBC # FLD: 14.1 %
SODIUM SERPL-SCNC: 142 MMOL/L
TSH SERPL-ACNC: 2.42 UIU/ML
VIT B12 SERPL-MCNC: 1131 PG/ML
WBC # FLD AUTO: 3.76 K/UL

## 2025-06-26 ENCOUNTER — APPOINTMENT (OUTPATIENT)
Dept: INTERNAL MEDICINE | Facility: CLINIC | Age: 70
End: 2025-06-26
Payer: MEDICARE

## 2025-06-26 VITALS
BODY MASS INDEX: 25.4 KG/M2 | SYSTOLIC BLOOD PRESSURE: 160 MMHG | DIASTOLIC BLOOD PRESSURE: 80 MMHG | TEMPERATURE: 98 F | WEIGHT: 138 LBS | OXYGEN SATURATION: 96 % | HEART RATE: 69 BPM | HEIGHT: 62 IN

## 2025-06-26 VITALS — DIASTOLIC BLOOD PRESSURE: 76 MMHG | SYSTOLIC BLOOD PRESSURE: 132 MMHG

## 2025-06-26 PROCEDURE — G2211 COMPLEX E/M VISIT ADD ON: CPT

## 2025-06-26 PROCEDURE — 99213 OFFICE O/P EST LOW 20 MIN: CPT

## 2025-06-26 NOTE — PHYSICAL EXAM
[Normal Sclera/Conjunctiva] : normal sclera/conjunctiva [Normal Outer Ear/Nose] : the outer ears and nose were normal in appearance [No JVD] : no jugular venous distention [No Respiratory Distress] : no respiratory distress  [No Accessory Muscle Use] : no accessory muscle use [Clear to Auscultation] : lungs were clear to auscultation bilaterally [Normal Rate] : normal rate  [Regular Rhythm] : with a regular rhythm [No Edema] : there was no peripheral edema [Soft] : abdomen soft [Non-distended] : non-distended [Non Tender] : non-tender [No Focal Deficits] : no focal deficits [Normal] : affect was normal and insight and judgment were intact

## 2025-06-26 NOTE — HISTORY OF PRESENT ILLNESS
[(Patient denies any chest pain, claudication, dyspnea on exertion, orthopnea, palpitations or syncope)] : Patient denies any chest pain, claudication, dyspnea on exertion, orthopnea, palpitations or syncope [Moderate (4-6 METs)] : Moderate (4-6 METs) [Anti-Platelet Agents: _____] : Anti-Platelet Agents: [unfilled] [Aortic Stenosis] : no aortic stenosis [Atrial Fibrillation] : no atrial fibrillation [Coronary Artery Disease] : no coronary artery disease [Recent Myocardial Infarction] : no recent myocardial infarction [Implantable Device/Pacemaker] : no implantable device/pacemaker [Asthma] : no asthma [COPD] : no COPD [Sleep Apnea] : no sleep apnea [Smoker] : not a smoker [Family Member] : no family member with adverse anesthesia reaction/sudden death [Self] : no previous adverse anesthesia reaction [Chronic Anticoagulation] : no chronic anticoagulation [Chronic Kidney Disease] : no chronic kidney disease [Diabetes] : no diabetes [FreeTextEntry1] : Cataract Surgery [FreeTextEntry2] : 7/10/25 [FreeTextEntry4] : 69 y/o F with PMH of HTN, HLD, hypothyroidism, prediabetes, glaucoma (sp laser iritoectomy in past) is coming in for [facility] for cataract surgery.  Feels well, denies fevers, chills or other ROS   Has had worsening vision due to cataracts  [FreeTextEntry7] : Cardiac Exercise Stress Test 11/2024: Conclusions:  1. Patient achieved 4 METS, which is consistent with poor exercise capacity considering age and other clinical characteristics.  2. The Duke Treadmill Score is 7.00, which is consistent with low risk (=5) for future cardiac events.  11/2024 TTE: CONCLUSIONS:   1. Left ventricular cavity is normal in size. Left ventricular wall thickness is normal. Left ventricular systolic function is normal with an ejection fraction of 61 % by 3D. There are no regional wall motion abnormalities seen.  2. There is mild (grade 1) left ventricular diastolic dysfunction.  3. Normal right ventricular cavity size and normal right ventricular systolic function.  4. Structurally normal mitral valve with normal leaflet excursion. There is calcification of the mitral valve annulus. There is trace mitral regurgitation.

## 2025-06-26 NOTE — ASSESSMENT
[Patient Optimized for Surgery] : Patient optimized for surgery [Modify anti-platelet treatment prior to procedure] : Modify anti-platelet treatment prior to procedure [No Further Testing Recommended] : no further testing recommended [As per surgery] : as per surgery [FreeTextEntry4] : 71 y/o F with PMH of HTN, HLD, hypothyroidism, prediabetes, glaucoma (sp laser iritoectomy in past) is coming in for [facility] for cataract surgery.  #Preop exam -EKG done last week - sinus w/o any acute ST changes -Had recent cardiac workup done -Recent CBC, CMP overall unremarkable -METS>4, no active cardiac symptoms at this time  Patient is medically optimized for this low risk surgery [FreeTextEntry6] : Hold ASA 3 days prior to surgery

## 2025-07-03 ENCOUNTER — RESULT REVIEW (OUTPATIENT)
Age: 70
End: 2025-07-03

## 2025-07-03 ENCOUNTER — APPOINTMENT (OUTPATIENT)
Dept: ULTRASOUND IMAGING | Facility: CLINIC | Age: 70
End: 2025-07-03
Payer: MEDICARE

## 2025-07-03 ENCOUNTER — OUTPATIENT (OUTPATIENT)
Dept: OUTPATIENT SERVICES | Facility: HOSPITAL | Age: 70
LOS: 1 days | End: 2025-07-03
Payer: MEDICARE

## 2025-07-03 DIAGNOSIS — R92.0 MAMMOGRAPHIC MICROCALCIFICATION FOUND ON DIAGNOSTIC IMAGING OF BREAST: ICD-10-CM

## 2025-07-03 DIAGNOSIS — Z98.890 OTHER SPECIFIED POSTPROCEDURAL STATES: Chronic | ICD-10-CM

## 2025-07-03 DIAGNOSIS — Z90.710 ACQUIRED ABSENCE OF BOTH CERVIX AND UTERUS: Chronic | ICD-10-CM

## 2025-07-03 PROCEDURE — 19083 BX BREAST 1ST LESION US IMAG: CPT | Mod: RT

## 2025-07-03 PROCEDURE — 77065 DX MAMMO INCL CAD UNI: CPT

## 2025-07-03 PROCEDURE — 19084 BX BREAST ADD LESION US IMAG: CPT

## 2025-07-03 PROCEDURE — 19084 BX BREAST ADD LESION US IMAG: CPT | Mod: RT

## 2025-07-03 PROCEDURE — 19083 BX BREAST 1ST LESION US IMAG: CPT

## 2025-07-03 PROCEDURE — 88305 TISSUE EXAM BY PATHOLOGIST: CPT | Mod: 26

## 2025-07-03 PROCEDURE — A4648: CPT

## 2025-07-03 PROCEDURE — 77065 DX MAMMO INCL CAD UNI: CPT | Mod: 26,RT

## 2025-07-03 PROCEDURE — 88305 TISSUE EXAM BY PATHOLOGIST: CPT

## 2025-07-07 ENCOUNTER — NON-APPOINTMENT (OUTPATIENT)
Age: 70
End: 2025-07-07

## 2025-07-07 ENCOUNTER — APPOINTMENT (OUTPATIENT)
Dept: OPHTHALMOLOGY | Facility: CLINIC | Age: 70
End: 2025-07-07
Payer: MEDICARE

## 2025-07-07 LAB — SURGICAL PATHOLOGY STUDY: SIGNIFICANT CHANGE UP

## 2025-07-07 PROCEDURE — 92083 EXTENDED VISUAL FIELD XM: CPT

## 2025-07-07 PROCEDURE — 92012 INTRM OPH EXAM EST PATIENT: CPT

## 2025-07-07 PROCEDURE — 92136 OPHTHALMIC BIOMETRY: CPT

## 2025-07-08 ENCOUNTER — NON-APPOINTMENT (OUTPATIENT)
Age: 70
End: 2025-07-08

## 2025-07-10 ENCOUNTER — APPOINTMENT (OUTPATIENT)
Dept: OPHTHALMOLOGY | Facility: AMBULATORY SURGERY CENTER | Age: 70
End: 2025-07-10
Payer: MEDICARE

## 2025-07-10 PROCEDURE — 66984 XCAPSL CTRC RMVL W/O ECP: CPT | Mod: LT

## 2025-07-11 ENCOUNTER — NON-APPOINTMENT (OUTPATIENT)
Age: 70
End: 2025-07-11

## 2025-07-11 ENCOUNTER — APPOINTMENT (OUTPATIENT)
Dept: OPHTHALMOLOGY | Facility: CLINIC | Age: 70
End: 2025-07-11
Payer: MEDICARE

## 2025-07-11 PROCEDURE — 99024 POSTOP FOLLOW-UP VISIT: CPT

## 2025-07-15 ENCOUNTER — APPOINTMENT (OUTPATIENT)
Dept: ULTRASOUND IMAGING | Facility: CLINIC | Age: 70
End: 2025-07-15
Payer: MEDICARE

## 2025-07-15 ENCOUNTER — RESULT REVIEW (OUTPATIENT)
Age: 70
End: 2025-07-15

## 2025-07-15 ENCOUNTER — APPOINTMENT (OUTPATIENT)
Dept: MAMMOGRAPHY | Facility: CLINIC | Age: 70
End: 2025-07-15
Payer: MEDICARE

## 2025-07-15 ENCOUNTER — OUTPATIENT (OUTPATIENT)
Dept: OUTPATIENT SERVICES | Facility: HOSPITAL | Age: 70
LOS: 1 days | End: 2025-07-15
Payer: MEDICARE

## 2025-07-15 DIAGNOSIS — R92.0 MAMMOGRAPHIC MICROCALCIFICATION FOUND ON DIAGNOSTIC IMAGING OF BREAST: ICD-10-CM

## 2025-07-15 DIAGNOSIS — Z98.890 OTHER SPECIFIED POSTPROCEDURAL STATES: Chronic | ICD-10-CM

## 2025-07-15 DIAGNOSIS — Z90.710 ACQUIRED ABSENCE OF BOTH CERVIX AND UTERUS: Chronic | ICD-10-CM

## 2025-07-15 PROCEDURE — 77065 DX MAMMO INCL CAD UNI: CPT | Mod: 26,RT

## 2025-07-15 PROCEDURE — 19081 BX BREAST 1ST LESION STRTCTC: CPT | Mod: RT

## 2025-07-15 PROCEDURE — 19083 BX BREAST 1ST LESION US IMAG: CPT

## 2025-07-15 PROCEDURE — 76098 X-RAY EXAM SURGICAL SPECIMEN: CPT | Mod: 26

## 2025-07-15 PROCEDURE — 88305 TISSUE EXAM BY PATHOLOGIST: CPT | Mod: 26

## 2025-07-15 PROCEDURE — 77065 DX MAMMO INCL CAD UNI: CPT

## 2025-07-15 PROCEDURE — A4648: CPT

## 2025-07-15 PROCEDURE — 19081 BX BREAST 1ST LESION STRTCTC: CPT

## 2025-07-15 PROCEDURE — 88305 TISSUE EXAM BY PATHOLOGIST: CPT

## 2025-07-15 PROCEDURE — 19083 BX BREAST 1ST LESION US IMAG: CPT | Mod: RT

## 2025-07-16 ENCOUNTER — NON-APPOINTMENT (OUTPATIENT)
Age: 70
End: 2025-07-16

## 2025-07-16 ENCOUNTER — APPOINTMENT (OUTPATIENT)
Dept: OPHTHALMOLOGY | Facility: CLINIC | Age: 70
End: 2025-07-16
Payer: MEDICARE

## 2025-07-16 PROCEDURE — 99024 POSTOP FOLLOW-UP VISIT: CPT

## 2025-07-18 LAB — SURGICAL PATHOLOGY STUDY: SIGNIFICANT CHANGE UP

## 2025-07-20 NOTE — CONSULT LETTER
[Dear  ___] : Dear  [unfilled], [Consult Letter:] : I had the pleasure of evaluating your patient, [unfilled]. [Please see my note below.] : Please see my note below. [Consult Closing:] : Thank you very much for allowing me to participate in the care of this patient.  If you have any questions, please do not hesitate to contact me. [Sincerely,] : Sincerely, [FreeTextEntry3] : Sylvia A. Reyes, M.D., MBS, FACS Breast Surgery Division of Surgical Oncology [address] [phone]

## 2025-07-20 NOTE — CONSULT LETTER
[Dear  ___] : Dear  [unfilled], [Consult Letter:] : I had the pleasure of evaluating your patient, [unfilled]. [Please see my note below.] : Please see my note below. [Consult Closing:] : Thank you very much for allowing me to participate in the care of this patient.  If you have any questions, please do not hesitate to contact me. [Sincerely,] : Sincerely, [FreeTextEntry3] : Sylvia A. Reyes, M.D., MBS, FACS Breast Surgery Division of Surgical Oncology [address] [phone]

## 2025-07-20 NOTE — DATA REVIEWED
[FreeTextEntry1] : BREAST PATH/RAD REVIEW.  [facility]. 11/27/24 BL SC MG (TC 3.3%): birads 0. SFGD.  - R bx clip in asymmetry (path unknown).  - R UOQ/ 9-10:00 1.2 cm mass w/ indistinct margins. (Rec Dx MG/US).  - R 12:00-RA question of faint loosey grouped calcs Vs reconstruction algorithm effect. (Rec Dx MG).  - L breast ok.   12/2/24 R Dx MG/US: birads 3. SFGD.  - Additional views c/w scattered and loosely benign calcs.  - R UOQ post-bx clip associated with benign appearing focal asymmetry.  - R 1.2 cm mass on SC MG is felt to represent benign fibroglandular tissue.  (Rec prior outside studies for comparison - if not priors than 6-month f/u R Dx MG/US)   6/3/25 R Dx MG: birads 0. SFGD.  - R 10:00 1.5 cm focal asymmetry/indistinct mass w/ numerous amorphous calcs and bx clip - R 9-10:00 2.7 cm anterior medial inferior to ^ calcs is an additional grouping of calcs.

## 2025-07-20 NOTE — DATA REVIEWED
[FreeTextEntry1] : BREAST PATH/RAD REVIEW.  [facility]. 11/27/24 BL SC MG (TC 3.3%): birads 0. SFGD.  - R bx clip in asymmetry (path unknown).  - R UOQ/ 9-10:00 1.2 cm mass w/ indistinct margins. (Rec Dx MG/US).  - R 12:00-RA question of faint loosey grouped calcs Vs reconstruction algorithm effect. (Rec Dx MG).  - L breast ok.   12/2/24 R Dx MG/US: birads 3. SFGD.  - Additional views c/w scattered and loosely benign calcs.  - R UOQ post-bx clip associated with benign appearing focal asymmetry.  - R 1.2 cm mass on SC MG is felt to represent benign fibroglandular tissue.  (Rec prior outside studies for comparison - if not priors than 6-month f/u R Dx MG/US)   6/3/25 R Dx MG: birads 0. SFGD.  - R 10:00 1.5 cm focal asymmetry/indistinct mass w/ numerous amorphous calcs and bx clip - R 9-10:00 2.7 cm anterior medial inferior to ^ calcs is an additional grouping of calcs.

## 2025-07-20 NOTE — CONSULT LETTER
[Dear  ___] : Dear  [unfilled], [Consult Letter:] : I had the pleasure of evaluating your patient, [unfilled]. [Please see my note below.] : Please see my note below. [Consult Closing:] : Thank you very much for allowing me to participate in the care of this patient.  If you have any questions, please do not hesitate to contact me. [Sincerely,] : Sincerely, [FreeTextEntry3] : Sylvia A. Reyes, M.D., MBS, FACS Breast Surgery Division of Surgical Oncology [address] [phone]

## 2025-07-20 NOTE — DATA REVIEWED
[FreeTextEntry1] : BREAST PATH/RAD REVIEW.  [facility]. 11/27/24 BL SC MG (TC 3.3%): birads 0. SFGD.  - R bx clip in asymmetry (path unknown).  - R UOQ/ 9-10:00 1.2 cm mass w/ indistinct margins. (Rec Dx MG/US).  - R 12:00-RA question of faint loosey grouped calcs Vs reconstruction algorithm effect. (Rec Dx MG).  - L breast ok.   12/2/24 R Dx MG/US: birads 3. SFGD.  - Additional views c/w scattered and loosely benign calcs.  - R UOQ post-bx clip associated with benign appearing focal asymmetry.  - R 1.2 cm mass on SC MG is felt to represent benign fibroglandular tissue.  (Rec prior outside studies for comparison - if not priors than 6-month f/u R Dx MG/US)   6/3/25 R Dx MG: birads 0. SFGD.  - R 10:00 1.5 cm focal asymmetry/indistinct mass w/ numerous amorphous calcs and bx clip - R 9-10:00 2.7 cm anterior medial inferior to ^ calcs is an additional grouping of calcs.

## 2025-07-20 NOTE — HISTORY OF PRESENT ILLNESS
[FreeTextEntry1] : MICHAEL ARROYO is a 69 y/o F Referred by: Breast .  Dr. Alexander Chan (PCP)  Prior Bx: Right needle biopsy in 11/2022 - Benign.  PMHx:  PSHx:  Meds:  NKDA Family Hx: GYN: GXPX, menarche age XX, LMP XX. Age at first pregnancy XX.  x . h/o hormone replacement XX. Not/Is on hormone replacement currently. Bra size: Occupation:  Social: Smoking:         ETOH:

## 2025-07-20 NOTE — HISTORY OF PRESENT ILLNESS
[FreeTextEntry1] : MICHAEL ARROYO is a 71 y/o F Referred by: Breast .  Dr. Alexander Chan (PCP)  Prior Bx: Right needle biopsy in 11/2022 - Benign.  PMHx:  PSHx:  Meds:  NKDA Family Hx: GYN: GXPX, menarche age XX, LMP XX. Age at first pregnancy XX.  x . h/o hormone replacement XX. Not/Is on hormone replacement currently. Bra size: Occupation:  Social: Smoking:         ETOH:

## 2025-07-21 ENCOUNTER — APPOINTMENT (OUTPATIENT)
Facility: CLINIC | Age: 70
End: 2025-07-21
Payer: MEDICARE

## 2025-07-21 ENCOUNTER — APPOINTMENT (OUTPATIENT)
Dept: CARDIOLOGY | Facility: CLINIC | Age: 70
End: 2025-07-21
Payer: MEDICARE

## 2025-07-21 VITALS
SYSTOLIC BLOOD PRESSURE: 132 MMHG | DIASTOLIC BLOOD PRESSURE: 78 MMHG | BODY MASS INDEX: 22.63 KG/M2 | OXYGEN SATURATION: 100 % | HEIGHT: 62 IN | HEART RATE: 68 BPM | WEIGHT: 123 LBS

## 2025-07-21 VITALS
SYSTOLIC BLOOD PRESSURE: 133 MMHG | WEIGHT: 123 LBS | BODY MASS INDEX: 22.63 KG/M2 | DIASTOLIC BLOOD PRESSURE: 82 MMHG | OXYGEN SATURATION: 95 % | HEART RATE: 69 BPM | HEIGHT: 62 IN

## 2025-07-21 DIAGNOSIS — R00.1 BRADYCARDIA, UNSPECIFIED: ICD-10-CM

## 2025-07-21 DIAGNOSIS — E78.5 HYPERLIPIDEMIA, UNSPECIFIED: ICD-10-CM

## 2025-07-21 DIAGNOSIS — N60.99 UNSPECIFIED BENIGN MAMMARY DYSPLASIA OF UNSPECIFIED BREAST: ICD-10-CM

## 2025-07-21 PROCEDURE — 99204 OFFICE O/P NEW MOD 45 MIN: CPT

## 2025-07-21 PROCEDURE — G2211 COMPLEX E/M VISIT ADD ON: CPT

## 2025-07-21 NOTE — DISCUSSION/SUMMARY
[FreeTextEntry1] : 70F HTN, HLD with sinus bradycardia   HTN: BP decently controlled today. Continue HCTZ 12.5mg, K 4.4 (mild hemolysis), Crt 0.90  HLD: Lipids good, LDL 94. Continue simvastatin 10mg, mild elevation in ALT, likely 2/2 to hemolysis. Watch closely.  Hospital admission for bradycardia last year. EST without ischemia, TTE with normal LV fxn. Subsequent fall in March, mechanical in nature per pt. Continue to monitor for symptoms.   RV 6M

## 2025-07-21 NOTE — HISTORY OF PRESENT ILLNESS
[FreeTextEntry1] : 70F with HTN, HLD for cardiac evaluation   Previously following with Dr. Joe Alvarez, seeking cardiologist closer to home   Feeling generally well  Patient denies chest pain, shortness of breath, palpitations, dizziness, lightheadedness, syncope. Climbs stairs slowly but without exertive symptoms  No formal exercise   HISTORY  November 2024 - Admitted to [facility] for bradycardia, HR noted in 40s.  EST without ischemia, TTE with normal LV fxn.   March 2025 - S/p mechanical fall. Pt tripped on sidewalk resulting in facial lacerations requiring sutures.   Never smoker. Mother - CVA. Retired teacher.   ECG: SR, no ST-T wave changes EST 2024: 7:00, poor exercise capacity, no ischemia  TTE 2024:   1. Left ventricular cavity is normal in size. Left ventricular wall thickness is normal. Left ventricular systolic function is normal with an ejection fraction of 61 % by 3D. There are no regional wall motion abnormalities seen.  2. There is mild (grade 1) left ventricular diastolic dysfunction.  3. Normal right ventricular cavity size and normal right ventricular systolic function.  4. Structurally normal mitral valve with normal leaflet excursion. There is calcification of the mitral valve annulus. There is trace mitral regurgitation.  Asa 81mg  Simvastatin 10mg   HCTZ 12.5mg

## 2025-07-22 ENCOUNTER — NON-APPOINTMENT (OUTPATIENT)
Age: 70
End: 2025-07-22

## 2025-07-28 ENCOUNTER — APPOINTMENT (OUTPATIENT)
Dept: INTERNAL MEDICINE | Facility: CLINIC | Age: 70
End: 2025-07-28
Payer: MEDICARE

## 2025-07-28 VITALS
DIASTOLIC BLOOD PRESSURE: 62 MMHG | OXYGEN SATURATION: 97 % | BODY MASS INDEX: 22.63 KG/M2 | HEIGHT: 62 IN | WEIGHT: 123 LBS | HEART RATE: 65 BPM | SYSTOLIC BLOOD PRESSURE: 118 MMHG

## 2025-07-28 DIAGNOSIS — H40.9 UNSPECIFIED GLAUCOMA: ICD-10-CM

## 2025-07-28 DIAGNOSIS — Z01.818 ENCOUNTER FOR OTHER PREPROCEDURAL EXAMINATION: ICD-10-CM

## 2025-07-28 DIAGNOSIS — N60.91 UNSPECIFIED BENIGN MAMMARY DYSPLASIA OF RIGHT BREAST: ICD-10-CM

## 2025-07-28 PROCEDURE — 99214 OFFICE O/P EST MOD 30 MIN: CPT

## 2025-07-28 PROCEDURE — G2211 COMPLEX E/M VISIT ADD ON: CPT

## 2025-07-28 RX ORDER — ACETAZOLAMIDE 500 MG/1
500 CAPSULE ORAL
Refills: 0 | Status: ACTIVE | COMMUNITY
Start: 2025-07-28

## 2025-07-28 RX ORDER — NETARSUDIL 0.2 MG/ML
0.02 SOLUTION/ DROPS OPHTHALMIC; TOPICAL
Refills: 0 | Status: ACTIVE | COMMUNITY
Start: 2025-07-28

## 2025-07-28 RX ORDER — PREDNISOLONE ACETATE 10 MG/ML
1 SUSPENSION/ DROPS OPHTHALMIC
Refills: 0 | Status: ACTIVE | COMMUNITY
Start: 2025-07-28

## 2025-07-28 NOTE — PHYSICAL EXAM
[Normal Sclera/Conjunctiva] : normal sclera/conjunctiva [Normal Outer Ear/Nose] : the outer ears and nose were normal in appearance [No JVD] : no jugular venous distention [No Respiratory Distress] : no respiratory distress  [No Accessory Muscle Use] : no accessory muscle use [Clear to Auscultation] : lungs were clear to auscultation bilaterally [Normal Rate] : normal rate  [Regular Rhythm] : with a regular rhythm [No Edema] : there was no peripheral edema [Soft] : abdomen soft [Non Tender] : non-tender [Non-distended] : non-distended [No Rash] : no rash [No Focal Deficits] : no focal deficits [Normal] : affect was normal and insight and judgment were intact

## 2025-07-28 NOTE — HISTORY OF PRESENT ILLNESS
[(Patient denies any chest pain, claudication, dyspnea on exertion, orthopnea, palpitations or syncope)] : Patient denies any chest pain, claudication, dyspnea on exertion, orthopnea, palpitations or syncope [Moderate (4-6 METs)] : Moderate (4-6 METs) [Aortic Stenosis] : no aortic stenosis [Atrial Fibrillation] : no atrial fibrillation [Coronary Artery Disease] : no coronary artery disease [Recent Myocardial Infarction] : no recent myocardial infarction [Implantable Device/Pacemaker] : no implantable device/pacemaker [Asthma] : no asthma [COPD] : no COPD [Sleep Apnea] : no sleep apnea [Smoker] : not a smoker [Family Member] : no family member with adverse anesthesia reaction/sudden death [Self] : no previous adverse anesthesia reaction [Chronic Anticoagulation] : no chronic anticoagulation [Chronic Kidney Disease] : no chronic kidney disease [Diabetes] : no diabetes [FreeTextEntry1] : Right eye Cataract Surgery [FreeTextEntry2] : 8/4/25 [FreeTextEntry4] : 69 y/o F with Atypical Ductal Hyperplasia of R breast, HTN, HLD, hypothyroidism, prediabetes, glaucoma (s/p laser iridectomy in past) is coming in for [facility] for R eye cataract surgery.  Previous Cataract surgery went well, coming in today for [facility] for R eye cataract surgery.  Feels well. Denies any fevers, chills or other acute complaints.

## 2025-07-28 NOTE — ASSESSMENT
[Patient Optimized for Surgery] : Patient optimized for surgery [No Further Testing Recommended] : no further testing recommended [As per surgery] : as per surgery [FreeTextEntry4] : 71 y/o F with Atypical Ductal Hyperplasia of R breast, HTN, HLD, hypothyroidism, prediabetes, glaucoma (s/p laser iridectomy in past) is coming in for [facility] for R eye cataract surgery.  #HTN -BP today at goal -C/w HCTZ 12.5mg qd   #Atypical Ductal Hyperplasia of R breast -Following w/ Breast Surgeon  #Preop exam -recent EKG from June 2025 SR, no ST-T wave changes -Recently evaluated by her Cardiologist -METS>4, no active cardiac symptoms at this time  Patient is medically optimized for this low risk surgery.  [FreeTextEntry7] : As per surgical team

## 2025-08-04 ENCOUNTER — APPOINTMENT (OUTPATIENT)
Dept: OPHTHALMOLOGY | Facility: AMBULATORY SURGERY CENTER | Age: 70
End: 2025-08-04
Payer: MEDICARE

## 2025-08-04 PROCEDURE — 66984 XCAPSL CTRC RMVL W/O ECP: CPT | Mod: RT,79

## 2025-08-05 ENCOUNTER — NON-APPOINTMENT (OUTPATIENT)
Age: 70
End: 2025-08-05

## 2025-08-05 ENCOUNTER — APPOINTMENT (OUTPATIENT)
Dept: OPHTHALMOLOGY | Facility: CLINIC | Age: 70
End: 2025-08-05
Payer: MEDICARE

## 2025-08-05 PROCEDURE — 99024 POSTOP FOLLOW-UP VISIT: CPT

## 2025-08-06 ENCOUNTER — OUTPATIENT (OUTPATIENT)
Dept: OUTPATIENT SERVICES | Facility: HOSPITAL | Age: 70
LOS: 1 days | End: 2025-08-06

## 2025-08-06 ENCOUNTER — APPOINTMENT (OUTPATIENT)
Facility: CLINIC | Age: 70
End: 2025-08-06

## 2025-08-06 VITALS
HEIGHT: 63 IN | TEMPERATURE: 98 F | OXYGEN SATURATION: 99 % | HEART RATE: 67 BPM | WEIGHT: 130.07 LBS | DIASTOLIC BLOOD PRESSURE: 78 MMHG | SYSTOLIC BLOOD PRESSURE: 145 MMHG | RESPIRATION RATE: 16 BRPM

## 2025-08-06 DIAGNOSIS — N60.91 UNSPECIFIED BENIGN MAMMARY DYSPLASIA OF RIGHT BREAST: ICD-10-CM

## 2025-08-06 DIAGNOSIS — E03.9 HYPOTHYROIDISM, UNSPECIFIED: ICD-10-CM

## 2025-08-06 DIAGNOSIS — Z98.41 CATARACT EXTRACTION STATUS, RIGHT EYE: Chronic | ICD-10-CM

## 2025-08-06 DIAGNOSIS — Z98.42 CATARACT EXTRACTION STATUS, LEFT EYE: Chronic | ICD-10-CM

## 2025-08-06 DIAGNOSIS — I10 ESSENTIAL (PRIMARY) HYPERTENSION: ICD-10-CM

## 2025-08-06 DIAGNOSIS — Z98.890 OTHER SPECIFIED POSTPROCEDURAL STATES: Chronic | ICD-10-CM

## 2025-08-11 ENCOUNTER — NON-APPOINTMENT (OUTPATIENT)
Age: 70
End: 2025-08-11

## 2025-08-11 ENCOUNTER — RESULT REVIEW (OUTPATIENT)
Age: 70
End: 2025-08-11

## 2025-08-11 ENCOUNTER — APPOINTMENT (OUTPATIENT)
Dept: OPHTHALMOLOGY | Facility: CLINIC | Age: 70
End: 2025-08-11
Payer: MEDICARE

## 2025-08-11 ENCOUNTER — APPOINTMENT (OUTPATIENT)
Dept: ULTRASOUND IMAGING | Facility: CLINIC | Age: 70
End: 2025-08-11

## 2025-08-11 ENCOUNTER — OUTPATIENT (OUTPATIENT)
Dept: OUTPATIENT SERVICES | Facility: HOSPITAL | Age: 70
LOS: 1 days | End: 2025-08-11
Payer: MEDICARE

## 2025-08-11 DIAGNOSIS — N60.91 UNSPECIFIED BENIGN MAMMARY DYSPLASIA OF RIGHT BREAST: ICD-10-CM

## 2025-08-11 DIAGNOSIS — Z98.890 OTHER SPECIFIED POSTPROCEDURAL STATES: Chronic | ICD-10-CM

## 2025-08-11 PROCEDURE — 99024 POSTOP FOLLOW-UP VISIT: CPT

## 2025-08-11 PROCEDURE — 19282 PERQ DEVICE BREAST EA IMAG: CPT

## 2025-08-11 PROCEDURE — C1739: CPT

## 2025-08-11 PROCEDURE — 19282 PERQ DEVICE BREAST EA IMAG: CPT | Mod: RT,76

## 2025-08-11 PROCEDURE — 19281 PERQ DEVICE BREAST 1ST IMAG: CPT

## 2025-08-11 PROCEDURE — 19281 PERQ DEVICE BREAST 1ST IMAG: CPT | Mod: RT

## 2025-08-13 ENCOUNTER — APPOINTMENT (OUTPATIENT)
Dept: MAMMOGRAPHY | Facility: IMAGING CENTER | Age: 70
End: 2025-08-13

## 2025-08-13 ENCOUNTER — OUTPATIENT (OUTPATIENT)
Dept: OUTPATIENT SERVICES | Facility: HOSPITAL | Age: 70
LOS: 1 days | End: 2025-08-13

## 2025-08-13 ENCOUNTER — APPOINTMENT (OUTPATIENT)
Dept: INTERNAL MEDICINE | Facility: CLINIC | Age: 70
End: 2025-08-13
Payer: MEDICARE

## 2025-08-13 ENCOUNTER — APPOINTMENT (OUTPATIENT)
Dept: SURGICAL ONCOLOGY | Facility: AMBULATORY SURGERY CENTER | Age: 70
End: 2025-08-13

## 2025-08-13 VITALS
DIASTOLIC BLOOD PRESSURE: 72 MMHG | SYSTOLIC BLOOD PRESSURE: 130 MMHG | OXYGEN SATURATION: 98 % | BODY MASS INDEX: 24.11 KG/M2 | WEIGHT: 131 LBS | HEART RATE: 62 BPM | HEIGHT: 62 IN

## 2025-08-13 DIAGNOSIS — Z98.42 CATARACT EXTRACTION STATUS, LEFT EYE: Chronic | ICD-10-CM

## 2025-08-13 DIAGNOSIS — Z90.710 ACQUIRED ABSENCE OF BOTH CERVIX AND UTERUS: Chronic | ICD-10-CM

## 2025-08-13 DIAGNOSIS — Z00.8 ENCOUNTER FOR OTHER GENERAL EXAMINATION: ICD-10-CM

## 2025-08-13 DIAGNOSIS — Z98.41 CATARACT EXTRACTION STATUS, RIGHT EYE: Chronic | ICD-10-CM

## 2025-08-13 PROBLEM — H26.9 UNSPECIFIED CATARACT: Chronic | Status: ACTIVE | Noted: 2025-08-06

## 2025-08-13 PROBLEM — E87.6 HYPOKALEMIA: Status: ACTIVE | Noted: 2025-08-13

## 2025-08-13 LAB
ANION GAP SERPL CALC-SCNC: 9 MMOL/L
BUN SERPL-MCNC: 12 MG/DL
CALCIUM SERPL-MCNC: 9.5 MG/DL
CHLORIDE SERPL-SCNC: 101 MMOL/L
CO2 SERPL-SCNC: 30 MMOL/L
CREAT SERPL-MCNC: 0.7 MG/DL
EGFRCR SERPLBLD CKD-EPI 2021: 92 ML/MIN/1.73M2
GLUCOSE SERPL-MCNC: 138 MG/DL
POTASSIUM SERPL-SCNC: 2.8 MMOL/L
SODIUM SERPL-SCNC: 141 MMOL/L

## 2025-08-13 PROCEDURE — 99214 OFFICE O/P EST MOD 30 MIN: CPT

## 2025-08-13 PROCEDURE — 93000 ELECTROCARDIOGRAM COMPLETE: CPT

## 2025-08-13 PROCEDURE — G2211 COMPLEX E/M VISIT ADD ON: CPT

## 2025-08-21 ENCOUNTER — APPOINTMENT (OUTPATIENT)
Dept: INTERNAL MEDICINE | Facility: CLINIC | Age: 70
End: 2025-08-21
Payer: MEDICARE

## 2025-08-21 VITALS
DIASTOLIC BLOOD PRESSURE: 74 MMHG | SYSTOLIC BLOOD PRESSURE: 124 MMHG | HEIGHT: 62 IN | BODY MASS INDEX: 24.29 KG/M2 | OXYGEN SATURATION: 99 % | TEMPERATURE: 98 F | WEIGHT: 132 LBS | HEART RATE: 68 BPM

## 2025-08-21 DIAGNOSIS — Z01.818 ENCOUNTER FOR OTHER PREPROCEDURAL EXAMINATION: ICD-10-CM

## 2025-08-21 DIAGNOSIS — I10 ESSENTIAL (PRIMARY) HYPERTENSION: ICD-10-CM

## 2025-08-21 DIAGNOSIS — E87.6 HYPOKALEMIA: ICD-10-CM

## 2025-08-21 PROCEDURE — 93000 ELECTROCARDIOGRAM COMPLETE: CPT

## 2025-08-21 PROCEDURE — 99214 OFFICE O/P EST MOD 30 MIN: CPT

## 2025-08-21 PROCEDURE — G2211 COMPLEX E/M VISIT ADD ON: CPT

## 2025-08-21 RX ORDER — POTASSIUM CHLORIDE 1.5 G/1.58G
20 POWDER, FOR SOLUTION ORAL TWICE DAILY
Qty: 14 | Refills: 0 | Status: COMPLETED | COMMUNITY
Start: 2025-08-13 | End: 2025-08-21

## 2025-08-22 ENCOUNTER — NON-APPOINTMENT (OUTPATIENT)
Age: 70
End: 2025-08-22

## 2025-08-22 LAB
ANION GAP SERPL CALC-SCNC: 13 MMOL/L
BUN SERPL-MCNC: 17 MG/DL
CALCIUM SERPL-MCNC: 8.9 MG/DL
CHLORIDE SERPL-SCNC: 114 MMOL/L
CO2 SERPL-SCNC: 18 MMOL/L
CREAT SERPL-MCNC: 0.82 MG/DL
EGFRCR SERPLBLD CKD-EPI 2021: 76 ML/MIN/1.73M2
GLUCOSE SERPL-MCNC: 112 MG/DL
HCT VFR BLD CALC: 38.3 %
HGB BLD-MCNC: 12.4 G/DL
MCHC RBC-ENTMCNC: 30.8 PG
MCHC RBC-ENTMCNC: 32.4 G/DL
MCV RBC AUTO: 95.3 FL
PLATELET # BLD AUTO: 204 K/UL
POTASSIUM SERPL-SCNC: 3.8 MMOL/L
RBC # BLD: 4.02 M/UL
RBC # FLD: 14.5 %
SODIUM SERPL-SCNC: 145 MMOL/L
WBC # FLD AUTO: 4.28 K/UL

## 2025-08-26 ENCOUNTER — NON-APPOINTMENT (OUTPATIENT)
Age: 70
End: 2025-08-26

## 2025-08-26 ENCOUNTER — APPOINTMENT (OUTPATIENT)
Dept: OPHTHALMOLOGY | Facility: CLINIC | Age: 70
End: 2025-08-26
Payer: MEDICARE

## 2025-08-26 PROCEDURE — 99024 POSTOP FOLLOW-UP VISIT: CPT

## 2025-08-27 ENCOUNTER — APPOINTMENT (OUTPATIENT)
Dept: INTERNAL MEDICINE | Facility: CLINIC | Age: 70
End: 2025-08-27

## 2025-08-29 ENCOUNTER — APPOINTMENT (OUTPATIENT)
Dept: SURGICAL ONCOLOGY | Facility: HOSPITAL | Age: 70
End: 2025-08-29

## 2025-08-29 ENCOUNTER — APPOINTMENT (OUTPATIENT)
Dept: MAMMOGRAPHY | Facility: IMAGING CENTER | Age: 70
End: 2025-08-29

## 2025-08-29 ENCOUNTER — OUTPATIENT (OUTPATIENT)
Dept: OUTPATIENT SERVICES | Facility: HOSPITAL | Age: 70
LOS: 1 days | End: 2025-08-29
Payer: MEDICARE

## 2025-08-29 ENCOUNTER — RESULT REVIEW (OUTPATIENT)
Age: 70
End: 2025-08-29

## 2025-08-29 VITALS
WEIGHT: 130.07 LBS | HEIGHT: 63 IN | DIASTOLIC BLOOD PRESSURE: 93 MMHG | SYSTOLIC BLOOD PRESSURE: 170 MMHG | HEART RATE: 65 BPM | RESPIRATION RATE: 16 BRPM | OXYGEN SATURATION: 100 % | TEMPERATURE: 98 F

## 2025-08-29 VITALS
SYSTOLIC BLOOD PRESSURE: 132 MMHG | OXYGEN SATURATION: 100 % | HEART RATE: 51 BPM | DIASTOLIC BLOOD PRESSURE: 76 MMHG | RESPIRATION RATE: 16 BRPM

## 2025-08-29 DIAGNOSIS — Z98.890 OTHER SPECIFIED POSTPROCEDURAL STATES: Chronic | ICD-10-CM

## 2025-08-29 DIAGNOSIS — N60.91 UNSPECIFIED BENIGN MAMMARY DYSPLASIA OF RIGHT BREAST: ICD-10-CM

## 2025-08-29 DIAGNOSIS — Z98.42 CATARACT EXTRACTION STATUS, LEFT EYE: Chronic | ICD-10-CM

## 2025-08-29 DIAGNOSIS — Z90.710 ACQUIRED ABSENCE OF BOTH CERVIX AND UTERUS: Chronic | ICD-10-CM

## 2025-08-29 DIAGNOSIS — Z98.41 CATARACT EXTRACTION STATUS, RIGHT EYE: Chronic | ICD-10-CM

## 2025-08-29 LAB — POTASSIUM BLDV-SCNC: 3.8 MMOL/L — SIGNIFICANT CHANGE UP (ref 3.5–5.1)

## 2025-08-29 PROCEDURE — 19301 PARTIAL MASTECTOMY: CPT | Mod: RT

## 2025-08-29 PROCEDURE — 88305 TISSUE EXAM BY PATHOLOGIST: CPT | Mod: 26

## 2025-08-29 PROCEDURE — 88304 TISSUE EXAM BY PATHOLOGIST: CPT | Mod: 26

## 2025-08-29 PROCEDURE — 14301 TIS TRNFR ANY 30.1-60 SQ CM: CPT

## 2025-08-29 RX ORDER — OFLOXACIN 3 MG/ML
1 SOLUTION OPHTHALMIC
Refills: 0 | DISCHARGE

## 2025-08-29 RX ORDER — ONDANSETRON HCL/PF 4 MG/2 ML
4 VIAL (ML) INJECTION ONCE
Refills: 0 | Status: ACTIVE | OUTPATIENT
Start: 2025-08-29 | End: 2026-07-28

## 2025-08-29 RX ORDER — ACETAZOLAMIDE 250 MG/1
1 TABLET ORAL
Refills: 0 | DISCHARGE

## 2025-08-29 RX ORDER — FENTANYL CITRATE-0.9 % NACL/PF 100MCG/2ML
25 SYRINGE (ML) INTRAVENOUS
Refills: 0 | Status: DISCONTINUED | OUTPATIENT
Start: 2025-08-29 | End: 2025-08-29

## 2025-08-29 RX ORDER — HYDROMORPHONE/SOD CHLOR,ISO/PF 2 MG/10 ML
0.2 SYRINGE (ML) INJECTION
Refills: 0 | Status: DISCONTINUED | OUTPATIENT
Start: 2025-08-29 | End: 2025-08-29

## 2025-08-29 RX ADMIN — Medication 0.2 MILLIGRAM(S): at 10:41

## 2025-08-29 RX ADMIN — Medication 0.2 MILLIGRAM(S): at 10:55

## 2025-09-01 ENCOUNTER — EMERGENCY (EMERGENCY)
Facility: HOSPITAL | Age: 70
LOS: 1 days | End: 2025-09-01
Attending: EMERGENCY MEDICINE | Admitting: EMERGENCY MEDICINE
Payer: MEDICARE

## 2025-09-01 VITALS
DIASTOLIC BLOOD PRESSURE: 80 MMHG | TEMPERATURE: 98 F | SYSTOLIC BLOOD PRESSURE: 163 MMHG | HEART RATE: 63 BPM | RESPIRATION RATE: 18 BRPM | OXYGEN SATURATION: 100 %

## 2025-09-01 VITALS
DIASTOLIC BLOOD PRESSURE: 84 MMHG | TEMPERATURE: 98 F | WEIGHT: 132.94 LBS | OXYGEN SATURATION: 98 % | HEIGHT: 63 IN | HEART RATE: 74 BPM | SYSTOLIC BLOOD PRESSURE: 147 MMHG | RESPIRATION RATE: 18 BRPM

## 2025-09-01 DIAGNOSIS — Z98.890 OTHER SPECIFIED POSTPROCEDURAL STATES: Chronic | ICD-10-CM

## 2025-09-01 DIAGNOSIS — Z90.710 ACQUIRED ABSENCE OF BOTH CERVIX AND UTERUS: Chronic | ICD-10-CM

## 2025-09-01 DIAGNOSIS — Z98.42 CATARACT EXTRACTION STATUS, LEFT EYE: Chronic | ICD-10-CM

## 2025-09-01 DIAGNOSIS — Z98.41 CATARACT EXTRACTION STATUS, RIGHT EYE: Chronic | ICD-10-CM

## 2025-09-01 LAB
ALBUMIN SERPL ELPH-MCNC: 3.6 G/DL — SIGNIFICANT CHANGE UP (ref 3.3–5)
ALP SERPL-CCNC: 56 U/L — SIGNIFICANT CHANGE UP (ref 40–120)
ALT FLD-CCNC: 20 U/L — SIGNIFICANT CHANGE UP (ref 4–33)
ANION GAP SERPL CALC-SCNC: 10 MMOL/L — SIGNIFICANT CHANGE UP (ref 7–14)
APTT BLD: 26.5 SEC — SIGNIFICANT CHANGE UP (ref 26.1–36.8)
AST SERPL-CCNC: 18 U/L — SIGNIFICANT CHANGE UP (ref 4–32)
BASOPHILS # BLD AUTO: 0.03 K/UL — SIGNIFICANT CHANGE UP (ref 0–0.2)
BASOPHILS NFR BLD AUTO: 0.5 % — SIGNIFICANT CHANGE UP (ref 0–2)
BILIRUB SERPL-MCNC: 0.5 MG/DL — SIGNIFICANT CHANGE UP (ref 0.2–1.2)
BUN SERPL-MCNC: 10 MG/DL — SIGNIFICANT CHANGE UP (ref 7–23)
CALCIUM SERPL-MCNC: 8.6 MG/DL — SIGNIFICANT CHANGE UP (ref 8.4–10.5)
CHLORIDE SERPL-SCNC: 111 MMOL/L — HIGH (ref 98–107)
CO2 SERPL-SCNC: 21 MMOL/L — LOW (ref 22–31)
CREAT SERPL-MCNC: 0.7 MG/DL — SIGNIFICANT CHANGE UP (ref 0.5–1.3)
EGFR: 92 ML/MIN/1.73M2 — SIGNIFICANT CHANGE UP
EGFR: 92 ML/MIN/1.73M2 — SIGNIFICANT CHANGE UP
EOSINOPHIL # BLD AUTO: 0.08 K/UL — SIGNIFICANT CHANGE UP (ref 0–0.5)
EOSINOPHIL NFR BLD AUTO: 1.4 % — SIGNIFICANT CHANGE UP (ref 0–6)
GLUCOSE SERPL-MCNC: 101 MG/DL — HIGH (ref 70–99)
HCT VFR BLD CALC: 28.5 % — LOW (ref 34.5–45)
HGB BLD-MCNC: 9.2 G/DL — LOW (ref 11.5–15.5)
IMM GRANULOCYTES # BLD AUTO: 0.03 K/UL — SIGNIFICANT CHANGE UP (ref 0–0.07)
IMM GRANULOCYTES NFR BLD AUTO: 0.5 % — SIGNIFICANT CHANGE UP (ref 0–0.9)
INR BLD: 1.01 RATIO — SIGNIFICANT CHANGE UP (ref 0.85–1.16)
LYMPHOCYTES # BLD AUTO: 1.3 K/UL — SIGNIFICANT CHANGE UP (ref 1–3.3)
LYMPHOCYTES NFR BLD AUTO: 23.5 % — SIGNIFICANT CHANGE UP (ref 13–44)
MCHC RBC-ENTMCNC: 31.2 PG — SIGNIFICANT CHANGE UP (ref 27–34)
MCHC RBC-ENTMCNC: 32.3 G/DL — SIGNIFICANT CHANGE UP (ref 32–36)
MCV RBC AUTO: 96.6 FL — SIGNIFICANT CHANGE UP (ref 80–100)
MONOCYTES # BLD AUTO: 0.55 K/UL — SIGNIFICANT CHANGE UP (ref 0–0.9)
MONOCYTES NFR BLD AUTO: 9.9 % — SIGNIFICANT CHANGE UP (ref 2–14)
NEUTROPHILS # BLD AUTO: 3.54 K/UL — SIGNIFICANT CHANGE UP (ref 1.8–7.4)
NEUTROPHILS NFR BLD AUTO: 64.2 % — SIGNIFICANT CHANGE UP (ref 43–77)
NRBC # BLD AUTO: 0 K/UL — SIGNIFICANT CHANGE UP (ref 0–0)
NRBC # FLD: 0 K/UL — SIGNIFICANT CHANGE UP (ref 0–0)
NRBC BLD AUTO-RTO: 0 /100 WBCS — SIGNIFICANT CHANGE UP (ref 0–0)
PLATELET # BLD AUTO: 179 K/UL — SIGNIFICANT CHANGE UP (ref 150–400)
PMV BLD: 12.2 FL — SIGNIFICANT CHANGE UP (ref 7–13)
POTASSIUM SERPL-MCNC: 3.2 MMOL/L — LOW (ref 3.5–5.3)
POTASSIUM SERPL-SCNC: 3.2 MMOL/L — LOW (ref 3.5–5.3)
PROT SERPL-MCNC: 6 G/DL — SIGNIFICANT CHANGE UP (ref 6–8.3)
PROTHROM AB SERPL-ACNC: 11.7 SEC — SIGNIFICANT CHANGE UP (ref 9.9–13.4)
RBC # BLD: 2.95 M/UL — LOW (ref 3.8–5.2)
RBC # FLD: 14.3 % — SIGNIFICANT CHANGE UP (ref 10.3–14.5)
SODIUM SERPL-SCNC: 142 MMOL/L — SIGNIFICANT CHANGE UP (ref 135–145)
TROPONIN T, HIGH SENSITIVITY RESULT: 10 NG/L — SIGNIFICANT CHANGE UP
TROPONIN T, HIGH SENSITIVITY RESULT: 12 NG/L — SIGNIFICANT CHANGE UP
WBC # BLD: 5.53 K/UL — SIGNIFICANT CHANGE UP (ref 3.8–10.5)
WBC # FLD AUTO: 5.53 K/UL — SIGNIFICANT CHANGE UP (ref 3.8–10.5)

## 2025-09-01 PROCEDURE — 71045 X-RAY EXAM CHEST 1 VIEW: CPT | Mod: 26

## 2025-09-01 PROCEDURE — 99284 EMERGENCY DEPT VISIT MOD MDM: CPT

## 2025-09-01 PROCEDURE — 93010 ELECTROCARDIOGRAM REPORT: CPT

## 2025-09-01 RX ORDER — ACETAMINOPHEN 500 MG/5ML
1000 LIQUID (ML) ORAL ONCE
Refills: 0 | Status: COMPLETED | OUTPATIENT
Start: 2025-09-01 | End: 2025-09-01

## 2025-09-01 RX ADMIN — Medication 100 MILLIEQUIVALENT(S): at 11:51

## 2025-09-01 RX ADMIN — Medication 40 MILLIEQUIVALENT(S): at 11:51

## 2025-09-01 RX ADMIN — Medication 400 MILLIGRAM(S): at 10:46

## 2025-09-04 ENCOUNTER — APPOINTMENT (OUTPATIENT)
Dept: INTERNAL MEDICINE | Facility: CLINIC | Age: 70
End: 2025-09-04
Payer: MEDICARE

## 2025-09-04 VITALS
OXYGEN SATURATION: 98 % | HEART RATE: 84 BPM | HEIGHT: 62 IN | SYSTOLIC BLOOD PRESSURE: 138 MMHG | BODY MASS INDEX: 24.84 KG/M2 | WEIGHT: 135 LBS | DIASTOLIC BLOOD PRESSURE: 74 MMHG

## 2025-09-04 DIAGNOSIS — E87.6 HYPOKALEMIA: ICD-10-CM

## 2025-09-04 DIAGNOSIS — I10 ESSENTIAL (PRIMARY) HYPERTENSION: ICD-10-CM

## 2025-09-04 PROCEDURE — 99214 OFFICE O/P EST MOD 30 MIN: CPT

## 2025-09-04 PROCEDURE — G2211 COMPLEX E/M VISIT ADD ON: CPT

## 2025-09-04 RX ORDER — BLOOD PRESSURE TEST KIT
KIT MISCELLANEOUS
Qty: 1 | Refills: 0 | Status: ACTIVE | COMMUNITY
Start: 2025-09-04 | End: 1900-01-01

## 2025-09-05 ENCOUNTER — APPOINTMENT (OUTPATIENT)
Dept: CARDIOLOGY | Facility: CLINIC | Age: 70
End: 2025-09-05
Payer: MEDICARE

## 2025-09-05 VITALS
WEIGHT: 135 LBS | HEART RATE: 70 BPM | SYSTOLIC BLOOD PRESSURE: 110 MMHG | BODY MASS INDEX: 24.84 KG/M2 | OXYGEN SATURATION: 99 % | DIASTOLIC BLOOD PRESSURE: 80 MMHG | HEIGHT: 62 IN

## 2025-09-05 DIAGNOSIS — E78.5 HYPERLIPIDEMIA, UNSPECIFIED: ICD-10-CM

## 2025-09-05 DIAGNOSIS — R07.89 OTHER CHEST PAIN: ICD-10-CM

## 2025-09-05 DIAGNOSIS — R55 SYNCOPE AND COLLAPSE: ICD-10-CM

## 2025-09-05 LAB
ALBUMIN SERPL ELPH-MCNC: 4.2 G/DL
ALP BLD-CCNC: 67 U/L
ALT SERPL-CCNC: 34 U/L
ANION GAP SERPL CALC-SCNC: 11 MMOL/L
AST SERPL-CCNC: 25 U/L
BILIRUB SERPL-MCNC: 0.7 MG/DL
BUN SERPL-MCNC: 13 MG/DL
CALCIUM SERPL-MCNC: 9.1 MG/DL
CHLORIDE SERPL-SCNC: 108 MMOL/L
CO2 SERPL-SCNC: 21 MMOL/L
CREAT SERPL-MCNC: 0.71 MG/DL
EGFRCR SERPLBLD CKD-EPI 2021: 90 ML/MIN/1.73M2
GLUCOSE SERPL-MCNC: 106 MG/DL
POTASSIUM SERPL-SCNC: 4 MMOL/L
PROT SERPL-MCNC: 6.3 G/DL
SODIUM SERPL-SCNC: 140 MMOL/L
SURGICAL PATHOLOGY STUDY: SIGNIFICANT CHANGE UP

## 2025-09-05 PROCEDURE — 99214 OFFICE O/P EST MOD 30 MIN: CPT

## 2025-09-05 PROCEDURE — G2211 COMPLEX E/M VISIT ADD ON: CPT

## 2025-09-05 RX ORDER — MEDICAL SUPPLY, MISCELLANEOUS
EACH MISCELLANEOUS
Qty: 1 | Refills: 0 | Status: ACTIVE | COMMUNITY
Start: 2025-09-05 | End: 1900-01-01

## 2025-09-08 LAB
BASOPHILS # BLD AUTO: 0.01 K/UL
BASOPHILS NFR BLD AUTO: 0.2 %
EOSINOPHIL # BLD AUTO: 0.09 K/UL
EOSINOPHIL NFR BLD AUTO: 2.1 %
HCT VFR BLD CALC: 29.6 %
HGB BLD-MCNC: 9.3 G/DL
IMM GRANULOCYTES NFR BLD AUTO: 0.7 %
LYMPHOCYTES # BLD AUTO: 0.94 K/UL
LYMPHOCYTES NFR BLD AUTO: 21.9 %
MAN DIFF?: NORMAL
MCHC RBC-ENTMCNC: 31.4 G/DL
MCHC RBC-ENTMCNC: 31.5 PG
MCV RBC AUTO: 100.3 FL
MONOCYTES # BLD AUTO: 0.49 K/UL
MONOCYTES NFR BLD AUTO: 11.4 %
NEUTROPHILS # BLD AUTO: 2.73 K/UL
NEUTROPHILS NFR BLD AUTO: 63.7 %
PLATELET # BLD AUTO: 226 K/UL
RBC # BLD: 2.95 M/UL
RBC # FLD: 14.8 %
WBC # FLD AUTO: 4.29 K/UL

## 2025-09-09 ENCOUNTER — APPOINTMENT (OUTPATIENT)
Dept: OPHTHALMOLOGY | Facility: CLINIC | Age: 70
End: 2025-09-09
Payer: MEDICARE

## 2025-09-09 ENCOUNTER — APPOINTMENT (OUTPATIENT)
Dept: INTERNAL MEDICINE | Facility: CLINIC | Age: 70
End: 2025-09-09

## 2025-09-09 ENCOUNTER — NON-APPOINTMENT (OUTPATIENT)
Age: 70
End: 2025-09-09

## 2025-09-09 PROCEDURE — 99024 POSTOP FOLLOW-UP VISIT: CPT

## 2025-09-11 ENCOUNTER — APPOINTMENT (OUTPATIENT)
Dept: SURGICAL ONCOLOGY | Facility: CLINIC | Age: 70
End: 2025-09-11

## 2025-09-11 VITALS
TEMPERATURE: 97.7 F | BODY MASS INDEX: 24.48 KG/M2 | HEIGHT: 62 IN | WEIGHT: 133 LBS | SYSTOLIC BLOOD PRESSURE: 154 MMHG | RESPIRATION RATE: 16 BRPM | DIASTOLIC BLOOD PRESSURE: 85 MMHG | OXYGEN SATURATION: 99 % | HEART RATE: 76 BPM

## 2025-09-12 ENCOUNTER — TRANSCRIPTION ENCOUNTER (OUTPATIENT)
Age: 70
End: 2025-09-12

## 2025-09-16 ENCOUNTER — NON-APPOINTMENT (OUTPATIENT)
Age: 70
End: 2025-09-16

## 2025-09-16 ENCOUNTER — APPOINTMENT (OUTPATIENT)
Dept: SURGICAL ONCOLOGY | Facility: CLINIC | Age: 70
End: 2025-09-16

## 2025-09-16 VITALS
TEMPERATURE: 97.8 F | BODY MASS INDEX: 24.48 KG/M2 | DIASTOLIC BLOOD PRESSURE: 76 MMHG | SYSTOLIC BLOOD PRESSURE: 144 MMHG | HEIGHT: 62 IN | RESPIRATION RATE: 17 BRPM | HEART RATE: 74 BPM | WEIGHT: 133 LBS | OXYGEN SATURATION: 98 %

## (undated) DEVICE — FORCEP RADIAL JAW 4 JUMBO 2.8MM 3.2MM 240CM ORANGE DISP

## (undated) DEVICE — FOLEY HOLDER STATLOCK 2 WAY ADULT

## (undated) DEVICE — ELCTR GROUNDING PAD ADULT COVIDIEN

## (undated) DEVICE — Device

## (undated) DEVICE — POSITIONER FOAM EGG CRATE ULNAR 2PCS (PINK)

## (undated) DEVICE — SUCTION YANKAUER NO CONTROL VENT

## (undated) DEVICE — TUBING SUCTION 20FT

## (undated) DEVICE — BRUSH COLONOSCOPY CYTOLOGY

## (undated) DEVICE — CATH ELCTR GLD PRB 10FR

## (undated) DEVICE — DRAPE WARMING SOLUTION 44 X 44"

## (undated) DEVICE — CATH IV SAFE BC 20G X 1.16" (PINK)

## (undated) DEVICE — PROBE FIAPC DIA 2.3MM/7FR LNTH 220CM/7.2FT

## (undated) DEVICE — TUBING IV SET GRAVITY 3Y 100" MACRO

## (undated) DEVICE — LIGASURE SMALL JAW

## (undated) DEVICE — DRAPE 1/2 SHEET 40X57"

## (undated) DEVICE — TUBING SUCTION CONN 6FT STERILE

## (undated) DEVICE — SAVI SCOUT HANDPIECE

## (undated) DEVICE — DRAPE LIGHT HANDLE COVER (BLUE)

## (undated) DEVICE — SYR LUER LOK 50CC

## (undated) DEVICE — BALLOON US ENDO

## (undated) DEVICE — BIOPSY FORCEP RADIAL JAW 4 STANDARD WITH NEEDLE

## (undated) DEVICE — SOL IRR POUR H2O 250ML

## (undated) DEVICE — SUT SILK 2-0 18" FS

## (undated) DEVICE — PACK IV START WITH CHG

## (undated) DEVICE — ELCTR BOVIE PENCIL SMOKE EVACUATION

## (undated) DEVICE — TUBING CAP SET ENDO 24HR USE GI

## (undated) DEVICE — VENODYNE/SCD SLEEVE CALF MEDIUM

## (undated) DEVICE — SPECIMEN CONTAINER 100ML

## (undated) DEVICE — NDL HYPO SAFE 18G X 1.5" (PINK)

## (undated) DEVICE — SOL IRR POUR NS 0.9% 500ML

## (undated) DEVICE — NDL INJ SCLERO INTERJECT 25G

## (undated) DEVICE — SUT VICRYL 3-0 18" SH UNDYED (POP-OFF)

## (undated) DEVICE — SOL INJ NS 0.9% 500ML 2 PORT

## (undated) DEVICE — NDL INJ SCLERO INTERJECT 23G

## (undated) DEVICE — SYR LUER LOK 5CC

## (undated) DEVICE — DRAIN JACKSON PRATT 10MM FLAT FULL NO TROCAR

## (undated) DEVICE — SYR ALLIANCE II INFLATION 60ML

## (undated) DEVICE — NDL HYPO REGULAR BEVEL 25G X 1.5" (BLUE)

## (undated) DEVICE — ONETRAC LIGHTED RETRACTOR 90 X 22MM DISP

## (undated) DEVICE — MARKING PEN W RULER

## (undated) DEVICE — DRAPE 3/4 SHEET 52X76"

## (undated) DEVICE — SPONGE PEANUT AUTO COUNT

## (undated) DEVICE — DRAPE INSTRUMENT POUCH 6.75" X 11"

## (undated) DEVICE — LIGASURE EXACT DISSECTOR

## (undated) DEVICE — POLY TRAP ETRAP

## (undated) DEVICE — DRAPE MAYO STAND 30"

## (undated) DEVICE — CLAMP BX HOT RAD JAW 3

## (undated) DEVICE — ONETRAC LIGHTED RETRACTOR 135 X 30MM DISP

## (undated) DEVICE — CATH IV SAFE BC 22G X 1" (BLUE)

## (undated) DEVICE — SUT SILK 2-0 30" SH

## (undated) DEVICE — SENSOR O2 FINGER ADULT

## (undated) DEVICE — STAPLER SKIN VISI-STAT 35 WIDE

## (undated) DEVICE — BITE BLOCK ADULT 20 X 27MM (GREEN)

## (undated) DEVICE — CATH ELECROHEM GLD PROBE 7FR

## (undated) DEVICE — PACK MAJOR ABDOMINAL WITH LAP

## (undated) DEVICE — SUT MONOCRYL 4-0 18" P-3 UNDYED

## (undated) DEVICE — IRRIGATOR BIO SHIELD

## (undated) DEVICE — DRSG TEGADERM 6 X 8"

## (undated) DEVICE — ELCTR BOVIE TIP BLADE INSULATED 2.75" EDGE

## (undated) DEVICE — MEDICATION LABELS W MARKER

## (undated) DEVICE — WARMING BLANKET LOWER ADULT